# Patient Record
Sex: MALE | Race: WHITE
[De-identification: names, ages, dates, MRNs, and addresses within clinical notes are randomized per-mention and may not be internally consistent; named-entity substitution may affect disease eponyms.]

---

## 2020-02-18 ENCOUNTER — HOSPITAL ENCOUNTER (INPATIENT)
Dept: HOSPITAL 74 - YASAS | Age: 50
LOS: 6 days | Discharge: HOME | DRG: 773 | End: 2020-02-24
Attending: ALLERGY & IMMUNOLOGY | Admitting: ALLERGY & IMMUNOLOGY
Payer: COMMERCIAL

## 2020-02-18 VITALS — BODY MASS INDEX: 29.2 KG/M2

## 2020-02-18 DIAGNOSIS — F10.230: ICD-10-CM

## 2020-02-18 DIAGNOSIS — R03.0: ICD-10-CM

## 2020-02-18 DIAGNOSIS — F11.23: Primary | ICD-10-CM

## 2020-02-18 DIAGNOSIS — F14.20: ICD-10-CM

## 2020-02-18 DIAGNOSIS — F19.24: ICD-10-CM

## 2020-02-18 DIAGNOSIS — H60.91: ICD-10-CM

## 2020-02-18 DIAGNOSIS — F19.282: ICD-10-CM

## 2020-02-18 DIAGNOSIS — F17.210: ICD-10-CM

## 2020-02-18 PROCEDURE — HZ2ZZZZ DETOXIFICATION SERVICES FOR SUBSTANCE ABUSE TREATMENT: ICD-10-PCS | Performed by: ALLERGY & IMMUNOLOGY

## 2020-02-18 RX ADMIN — Medication SCH MG: at 21:48

## 2020-02-18 NOTE — HP
COWS





- Scale


Resting Pulse: 0= [place] 80 or Below


Sweatin= Chills/Flushing


Restless Observation: 1= Difficult to Sit Still


Pupil Size: 0= Normal to Room Light


Bone or Joint Aches: 2= Severe Diffuse Aches


Runny Nose/ Eye Tearin= Runny Nose/Eyes


GI Upset > 30mins: 2= Nausea/Diarrhea


Tremor Observation: 0= None


Yawning Observation: 0= None


Anxiety or Irritability: 1=Feels Anxious/Irritable


Goose Flesh Skin: 0=Smooth Skin (latest  Bup yesterday   evening  . denies MMTP

  cocaine :  2 x/week  etoh - denies   other  illicits  - denies  tobacco :    ppd  .)


COWS Score: 9





CIWA Score


Nausea/Vomitin-No Nausea/No Vomiting


Muscle Tremors: None


Anxiety: 1-Mildly Anxious


Agitation: 0-Normal Activity


Paroxysmal Sweats: 1-Minimal Palms Moist


Orientation: 2-Disoriented Date<2 days


Tacttile Disturbances: 0-None


Auditory Disturbances: 0-None


Visual Disturbances: 0-None


Headache: 0-None Present


CIWA-Ar Total Score: 4





- Admission Criteria


[facility] Guidelines: Admission for Medically Managed Detox: 


Requires at least one of the followin. CIWA greater than 12


2. Seizures within the past 24 hours


3. Delirium tremens within the past 24 hours


4. Hallucinations within the past 24 hours


5. Acute intervention needed for co  occurring medical disorder


6. Acute intervention needed for co  occurring psychiatric disorder


7. Severe withdrawal that cannot be handled at a lower level of care (continued


    vomiting, continued diarrhea, abnormal vital signs) requiring intravenous


    medication and/or fluids


8. Pregnancy








Admission ROS [facility]





- HPI


Allergies/Adverse Reactions: 


 Allergies











Allergy/AdvReac Type Severity Reaction Status Date / Time


 


No Known Allergies Allergy   Verified 20 20:24











History of Present Illness: 





50  y.o. male her for heroin detox  , states does not want to continue in 

Buprenorphine program in [place]  . 


PMHX : anxiety  and HTN  , reports non- compliance  w/ meds. 


PSHX :  left  eye  injury  37  yrs ago 


Exam Limitations: Clinical Condition





- Review of Systems


Constitutional: No Symptoms Reported


EENT: reports: See HPI, Nose Congestion


Respiratory: reports: No Symptoms reported


Cardiac: reports: No Symptoms Reported


GI: reports: Nausea, Abdominal cramping


: reports: No Symptoms Reported


Musculoskeletal: reports: No Symptoms Reported


Integumentary: reports: No Symptoms Reported


Neuro: reports: No Symptoms reported


Endocrine: reports: No Symptoms Reported


Psychiatric: reports: Orientated x3, Agitated, Anxious





Patient History





- Smoking Cessation


Smoking history: Current every day smoker


Have you smoked in the past 12 months: Yes


Hx Chewing Tobacco Use: No


Initiated information on smoking cessation: Yes


'Breaking Loose' booklet given: 20





- Substances abused


  ** Heroin


Substance route: Inhalation


Frequency: Daily


Amount used: 1 bundle


Age of first use: 22


Date of last use: 20





  ** Alcohol


Substance route: Oral


Frequency: Daily


Amount used: 4 beers/ small bottle.


Age of first use: 17


Date of last use: 20





Admission Physical Exam [facility]





- Physical


General Appearance: Yes: Disheveled, Mild Distress, Sweating, Anxious


HEENTM: Yes: EOMI, Normocephalic, Normal Voice, Nasal Congestion, Rhinorrhea, 

Other (upper dentures)


Respiratory: Yes: Chest Non-Tender, Lungs Clear, Normal Breath Sounds, No 

Respiratory Distress, No Accessory Muscle Use


Neck: Yes: No masses,lesions,Nodules, Trachea in good position


Cardiology: Yes: Regular Rhythm, Regular Rate, S1, S2


Abdominal: Yes: Non Tender, Soft


Musculoskeletal: Yes: Gait Steady


Extremities: Yes: Normal Range of Motion, Non-Tender


Neurological: Yes: Alert, Motor Strength 5/5, Normal Mood/Affect


Integumentary: Yes: Warm





- Diagnostic


(1) Opioid dependence


Current Visit: Yes   Status: Chronic   


Qualifiers: 


   Substance use status: uncomplicated   Qualified Code(s): F11.20 - Opioid 

dependence, uncomplicated   





(2) Cocaine use disorder


Current Visit: Yes   Status: Chronic   





(3) Alcohol use disorder


Current Visit: Yes   Status: Chronic   





(4) Nicotine dependence


Current Visit: Yes   Status: Chronic   


Qualifiers: 


   Nicotine product type: cigarettes 





Inpatient Rehab Admission





- Rehab Decision to Admit


Inpatient rehab admission?: No

## 2020-02-19 LAB
ALBUMIN SERPL-MCNC: 3.2 G/DL (ref 3.4–5)
ALP SERPL-CCNC: 97 U/L (ref 45–117)
ALT SERPL-CCNC: 32 U/L (ref 13–61)
ANION GAP SERPL CALC-SCNC: 8 MMOL/L (ref 8–16)
AST SERPL-CCNC: 16 U/L (ref 15–37)
BILIRUB SERPL-MCNC: 0.2 MG/DL (ref 0.2–1)
BUN SERPL-MCNC: 15.4 MG/DL (ref 7–18)
CALCIUM SERPL-MCNC: 8.7 MG/DL (ref 8.5–10.1)
CHLORIDE SERPL-SCNC: 108 MMOL/L (ref 98–107)
CO2 SERPL-SCNC: 26 MMOL/L (ref 21–32)
CREAT SERPL-MCNC: 0.9 MG/DL (ref 0.55–1.3)
DEPRECATED RDW RBC AUTO: 12.9 % (ref 11.9–15.9)
GLUCOSE SERPL-MCNC: 103 MG/DL (ref 74–106)
HCT VFR BLD CALC: 41 % (ref 35.4–49)
HGB BLD-MCNC: 14.1 GM/DL (ref 11.7–16.9)
MCH RBC QN AUTO: 30.7 PG (ref 25.7–33.7)
MCHC RBC AUTO-ENTMCNC: 34.3 G/DL (ref 32–35.9)
MCV RBC: 89.6 FL (ref 80–96)
PLATELET # BLD AUTO: 181 K/MM3 (ref 134–434)
PMV BLD: 10.3 FL (ref 7.5–11.1)
POTASSIUM SERPLBLD-SCNC: 4 MMOL/L (ref 3.5–5.1)
PROT SERPL-MCNC: 6.6 G/DL (ref 6.4–8.2)
RBC # BLD AUTO: 4.57 M/MM3 (ref 4–5.6)
SODIUM SERPL-SCNC: 141 MMOL/L (ref 136–145)
WBC # BLD AUTO: 6.4 K/MM3 (ref 4–10)

## 2020-02-19 RX ADMIN — Medication SCH TAB: at 10:11

## 2020-02-19 RX ADMIN — Medication PRN MG: at 22:08

## 2020-02-19 RX ADMIN — METHOCARBAMOL PRN MG: 500 TABLET ORAL at 22:08

## 2020-02-19 RX ADMIN — METHOCARBAMOL PRN MG: 500 TABLET ORAL at 10:13

## 2020-02-19 RX ADMIN — Medication SCH MG: at 22:08
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
## 2020-02-19 NOTE — EKG
Test Reason : 

Blood Pressure : ***/*** mmHG

Vent. Rate : 077 BPM     Atrial Rate : 077 BPM

   P-R Int : 160 ms          QRS Dur : 098 ms

    QT Int : 366 ms       P-R-T Axes : 070 039 040 degrees

   QTc Int : 414 ms

 

NORMAL SINUS RHYTHM

NORMAL ECG

NO PREVIOUS ECGS AVAILABLE

Confirmed by MD MARIYA, ABIGAIL ([id number]) on 2/19/2020 11:32:44 AM

 

Referred By: Alber Hernandez           Confirmed By:ABIGAIL MERAZ MD

## 2020-02-19 NOTE — PN
[facility] CIWA





- CIWA Score


Nausea/Vomitin-Mild Nausea/No Vomiting


Muscle Tremors: 2


Anxiety: 2


Agitation: 2


Paroxysmal Sweats: No Perspiration


Orientation: 0-Oriented


Tacttile Disturbances: 1-Very Mild Itch/Numbness


Auditory Disturbances: 0-None


Visual Disturbances: 0-None


Headache: 1-Very Mild


CIWA-Ar Total Score: 9





[facility] COWS





- Scale


Resting Pulse: 1= [place] 


Sweatin= No chills or Flushing


Restless Observation: 1= Difficult to Sit Still


Pupil Size: 1= Pupils >than Normal


Bone or Joint Aches: 1= Mild Discomfort


Runny Nose/ Eye Tearin= Nasal Congestion


GI Upset > 30mins: 1= Stomach Cramp


Tremor Observation of Outstretched Hands: 1= Tremor Felt, Not Seen


Yawning Observation: 1= 1-2x During Session


Anxiety or Irritability: 2=Irritable/Anxious


Goose Flesh Skin: 0=Smooth Skin


COWS Score: 10





B[facility] Progress Note (SOAP)


Subjective: 





alert,irritable,anxious,interrupted sleep,pain in the body,back,tremor


Objective: 





20 10:45


 Vital Signs











Temperature  97.5 F L  20 09:20


 


Pulse Rate  90   20 09:20


 


Respiratory Rate  19   20 09:20


 


Blood Pressure  134/69   20 09:20


 


O2 Sat by Pulse Oximetry (%)      











20 10:46


labs pending


Assessment: 





20 10:46


withdrawal symptom


Plan: 





continue detox methadone regimen,will add valuim 10mgs q 4 hrs prn for 72 hrs

## 2020-02-20 RX ADMIN — ALUMINUM HYDROXIDE, MAGNESIUM HYDROXIDE, AND SIMETHICONE PRN ML: 200; 200; 20 SUSPENSION ORAL at 10:25

## 2020-02-20 RX ADMIN — NEOMYCIN SULFATE, POLYMYXIN B SULFATE AND HYDROCORTISONE SCH DROP: 10; 3.5; 1 SUSPENSION/ DROPS AURICULAR (OTIC) at 17:30

## 2020-02-20 RX ADMIN — ALUMINUM HYDROXIDE, MAGNESIUM HYDROXIDE, AND SIMETHICONE PRN ML: 200; 200; 20 SUSPENSION ORAL at 03:31

## 2020-02-20 RX ADMIN — NEOMYCIN SULFATE, POLYMYXIN B SULFATE AND HYDROCORTISONE SCH DROP: 10; 3.5; 1 SUSPENSION/ DROPS AURICULAR (OTIC) at 15:45

## 2020-02-20 RX ADMIN — Medication SCH TAB: at 10:23

## 2020-02-20 RX ADMIN — Medication SCH MG: at 22:08

## 2020-02-20 RX ADMIN — Medication PRN MG: at 22:09

## 2020-02-20 NOTE — PN
S CIWA





- CIWA Score


Nausea/Vomitin-No Nausea/No Vomiting


Muscle Tremors: 2


Anxiety: 2


Agitation: 2


Paroxysmal Sweats: No Perspiration


Orientation: 0-Oriented


Tacttile Disturbances: 1-Very Mild Itch/Numbness


Auditory Disturbances: 0-None


Visual Disturbances: 0-None


Headache: 1-Very Mild


CIWA-Ar Total Score: 8





[facility] Progress Note (SOAP)


Subjective: 





alert,irritable,anxious,interrupted sleep,pain in the body,pain in the right ear


Objective: 





20 09:47


 Vital Signs











Temperature  97.7 F   20 05:04


 


Pulse Rate  58 L  20 05:04


 


Respiratory Rate  18   20 05:04


 


Blood Pressure  104/65   20 05:04


 


O2 Sat by Pulse Oximetry (%)      











20 09:48


 Laboratory Last Values











WBC  6.4 K/mm3 (4.0-10.0)   20  07:35    


 


RBC  4.57 M/mm3 (4.00-5.60)   20  07:35    


 


Hgb  14.1 GM/dL (11.7-16.9)   20  07:35    


 


Hct  41.0 % (35.4-49)   20  07:35    


 


MCV  89.6 fl (80-96)   20  07:35    


 


MCH  30.7 pg (25.7-33.7)   20  07:35    


 


MCHC  34.3 g/dl (32.0-35.9)   20  07:35    


 


RDW  12.9 % (11.9-15.9)   20  07:35    


 


Plt Count  181 K/MM3 (134-434)   20  07:35    


 


MPV  10.3 fl (7.5-11.1)   20  07:35    


 


Sodium  141 mmol/L (136-145)   20  07:35    


 


Potassium  4.0 mmol/L (3.5-5.1)   20  07:35    


 


Chloride  108 mmol/L ()  H  20  07:35    


 


Carbon Dioxide  26 mmol/L (21-32)   20  07:35    


 


Anion Gap  8 MMOL/L (8-16)   20  07:35    


 


BUN  15.4 mg/dL (7-18)   20  07:35    


 


Creatinine  0.9 mg/dL (0.55-1.3)   20  07:35    


 


Est GFR (CKD-EPI)AfAm  116.65   20  07:35    


 


Est GFR (CKD-EPI)NonAf  100.64   20  07:35    


 


Random Glucose  103 mg/dL ()   20  07:35    


 


Calcium  8.7 mg/dL (8.5-10.1)   20  07:35    


 


Total Bilirubin  0.2 mg/dL (0.2-1)   20  07:35    


 


AST  16 U/L (15-37)   20  07:35    


 


ALT  32 U/L (13-61)   20  07:35    


 


Alkaline Phosphatase  97 U/L ()   20  07:35    


 


Total Protein  6.6 g/dl (6.4-8.2)   20  07:35    


 


Albumin  3.2 g/dl (3.4-5.0)  L  20  07:35    


 


RPR Titer  Nonreactive  (NONREACTIVE)   20  07:35    











Assessment: 





20 09:48


withdrawal symptom


20 09:49


redness of external ear canal


otitis externa


Plan: 





continue detox methadone regimen,cortisporin otic suspension 4 gtts right ear q 

6 hrs

## 2020-02-21 RX ADMIN — NEOMYCIN SULFATE, POLYMYXIN B SULFATE AND HYDROCORTISONE SCH DROP: 10; 3.5; 1 SUSPENSION/ DROPS AURICULAR (OTIC) at 18:16

## 2020-02-21 RX ADMIN — NEOMYCIN SULFATE, POLYMYXIN B SULFATE AND HYDROCORTISONE SCH DROP: 10; 3.5; 1 SUSPENSION/ DROPS AURICULAR (OTIC) at 11:43

## 2020-02-21 RX ADMIN — NEOMYCIN SULFATE, POLYMYXIN B SULFATE AND HYDROCORTISONE SCH DROP: 10; 3.5; 1 SUSPENSION/ DROPS AURICULAR (OTIC) at 05:10

## 2020-02-21 RX ADMIN — Medication SCH MG: at 22:11

## 2020-02-21 RX ADMIN — ALUMINUM HYDROXIDE, MAGNESIUM HYDROXIDE, AND SIMETHICONE PRN ML: 200; 200; 20 SUSPENSION ORAL at 10:16

## 2020-02-21 RX ADMIN — Medication SCH TAB: at 10:15

## 2020-02-21 RX ADMIN — Medication PRN MG: at 22:12

## 2020-02-21 RX ADMIN — NEOMYCIN SULFATE, POLYMYXIN B SULFATE AND HYDROCORTISONE SCH DROP: 10; 3.5; 1 SUSPENSION/ DROPS AURICULAR (OTIC) at 00:10

## 2020-02-21 NOTE — PN
[facility] COWS





- Scale


Resting Pulse: 0= [place] 80 or Below


Sweatin= No chills or Flushing


Restless Observation: 1= Difficult to Sit Still


Pupil Size: 1= Pupils >than Normal


Bone or Joint Aches: 1= Mild Discomfort


Runny Nose/ Eye Tearin= Nasal Congestion


GI Upset > 30mins: 1= Stomach Cramp


Tremor Observation of Outstretched Hands: 2= Slight Tremor Visible


Yawning Observation: 1= 1-2x During Session


Anxiety or Irritability: 2=Irritable/Anxious


Goose Flesh Skin: 0=Smooth Skin


COWS Score: 10





[facility] Progress Note (SOAP)


Subjective: 





alert,irritable,anxious,interrupted sleep,pain in the body


Objective: 





20 08:48


alert,irritable,anxious,interrupted sleep,tremor,pain in the body


20 08:48


 Vital Signs











Temperature  97.5 F L  20 06:17


 


Pulse Rate  72   20 06:17


 


Respiratory Rate  16   20 06:17


 


Blood Pressure  151/83   20 06:17


 


O2 Sat by Pulse Oximetry (%)      











Assessment: 





20 08:48


withdrawal symptom


Plan: 





continue detox methadone regimen,pain in the right ear is less
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
## 2020-02-22 RX ADMIN — NEOMYCIN SULFATE, POLYMYXIN B SULFATE AND HYDROCORTISONE SCH DROP: 10; 3.5; 1 SUSPENSION/ DROPS AURICULAR (OTIC) at 13:00

## 2020-02-22 RX ADMIN — NEOMYCIN SULFATE, POLYMYXIN B SULFATE AND HYDROCORTISONE SCH DROP: 10; 3.5; 1 SUSPENSION/ DROPS AURICULAR (OTIC) at 05:39

## 2020-02-22 RX ADMIN — Medication PRN MG: at 21:37

## 2020-02-22 RX ADMIN — HYDROXYZINE PAMOATE PRN MG: 25 CAPSULE ORAL at 18:30

## 2020-02-22 RX ADMIN — NEOMYCIN SULFATE, POLYMYXIN B SULFATE AND HYDROCORTISONE SCH DROP: 10; 3.5; 1 SUSPENSION/ DROPS AURICULAR (OTIC) at 21:36

## 2020-02-22 RX ADMIN — Medication SCH TAB: at 10:11

## 2020-02-22 RX ADMIN — Medication SCH MG: at 21:37

## 2020-02-22 RX ADMIN — NEOMYCIN SULFATE, POLYMYXIN B SULFATE AND HYDROCORTISONE SCH DROP: 10; 3.5; 1 SUSPENSION/ DROPS AURICULAR (OTIC) at 00:06

## 2020-02-22 NOTE — PN
[facility] CIWA





- CIWA Score


Nausea/Vomitin-No Nausea/No Vomiting


Muscle Tremors: None


Anxiety: 4-Mod. Anxious/Guarded


Agitation: 1-Slight > Activity


Paroxysmal Sweats: No Perspiration


Orientation: 0-Oriented


Tacttile Disturbances: 0-None


Auditory Disturbances: 2-Mild Harshness/Frighten


Visual Disturbances: 0-None


Headache: 0-None Present


CIWA-Ar Total Score: 7





[facility] COWS





- Scale


Resting Pulse: 1= [place] 


Sweatin= No chills or Flushing


Restless Observation: 0= Sits Still


Pupil Size: 0= Normal to Room Light


Bone or Joint Aches: 0= None


Runny Nose/ Eye Tearin= None


GI Upset > 30mins: 0= None


Tremor Observation of Outstretched Hands: 0= None


Yawning Observation: 1= 1-2x During Session


Anxiety or Irritability: 2=Irritable/Anxious


Goose Flesh Skin: 0=Smooth Skin


COWS Score: 4





[facility] Progress Note (SOAP)


Subjective: 





Anxious.


Objective: 


PATIENT A & O X 3, OBSERVED AMBULATING ON DETOX UNIT UNASSISTED. IN NO ACUTE 

DISTRESS.





20 13:41


 Vital Signs











Temperature  96.8 F L  20 08:30


 


Pulse Rate  81   20 08:30


 


Respiratory Rate  18   20 08:30


 


Blood Pressure  113/58 L  20 08:30


 


O2 Sat by Pulse Oximetry (%)      








 Laboratory Tests











  20





  07:35 07:35 07:35


 


WBC  6.4  


 


RBC  4.57  


 


Hgb  14.1  


 


Hct  41.0  


 


MCV  89.6  


 


MCH  30.7  


 


MCHC  34.3  


 


RDW  12.9  


 


Plt Count  181  


 


MPV  10.3  


 


Sodium   141 


 


Potassium   4.0 


 


Chloride   108 H 


 


Carbon Dioxide   26 


 


Anion Gap   8 


 


BUN   15.4 


 


Creatinine   0.9 


 


Est GFR (CKD-EPI)AfAm   116.65 


 


Est GFR (CKD-EPI)NonAf   100.64 


 


Random Glucose   103 


 


Calcium   8.7 


 


Total Bilirubin   0.2 


 


AST   16 


 


ALT   32 


 


Alkaline Phosphatase   97 


 


Total Protein   6.6 


 


Albumin   3.2 L 


 


RPR Titer    Nonreactive








LABS NOTED.


Assessment: 





20 13:41


WITHDRAWAL SYMPTOMS.


Plan: 





CONTINUE DETOX.





PATIENT SCHEDULED FOR D/C FROM DETOX UNIT TOMORROW.

## 2020-02-23 RX ADMIN — Medication SCH MG: at 22:08

## 2020-02-23 RX ADMIN — METHOCARBAMOL PRN MG: 500 TABLET ORAL at 23:32

## 2020-02-23 RX ADMIN — NEOMYCIN SULFATE, POLYMYXIN B SULFATE AND HYDROCORTISONE SCH: 10; 3.5; 1 SUSPENSION/ DROPS AURICULAR (OTIC) at 02:15

## 2020-02-23 RX ADMIN — Medication SCH TAB: at 10:21

## 2020-02-23 RX ADMIN — NEOMYCIN SULFATE, POLYMYXIN B SULFATE AND HYDROCORTISONE SCH DROP: 10; 3.5; 1 SUSPENSION/ DROPS AURICULAR (OTIC) at 06:16

## 2020-02-23 RX ADMIN — NEOMYCIN SULFATE, POLYMYXIN B SULFATE AND HYDROCORTISONE SCH DROP: 10; 3.5; 1 SUSPENSION/ DROPS AURICULAR (OTIC) at 12:45

## 2020-02-23 RX ADMIN — Medication PRN MG: at 22:08

## 2020-02-23 RX ADMIN — HYDROXYZINE PAMOATE PRN MG: 25 CAPSULE ORAL at 23:32

## 2020-02-23 RX ADMIN — HYDROXYZINE PAMOATE PRN MG: 25 CAPSULE ORAL at 02:48

## 2020-02-23 RX ADMIN — ALUMINUM HYDROXIDE, MAGNESIUM HYDROXIDE, AND SIMETHICONE PRN ML: 200; 200; 20 SUSPENSION ORAL at 22:10

## 2020-02-23 RX ADMIN — NEOMYCIN SULFATE, POLYMYXIN B SULFATE AND HYDROCORTISONE SCH: 10; 3.5; 1 SUSPENSION/ DROPS AURICULAR (OTIC) at 20:21

## 2020-02-23 NOTE — PN
[facility] CIWA





- CIWA Score


Nausea/Vomitin-No Nausea/No Vomiting


Muscle Tremors: None


Anxiety: 2


Agitation: 1-Slight > Activity


Paroxysmal Sweats: 2


Orientation: 0-Oriented


Tacttile Disturbances: 0-None


Auditory Disturbances: 0-None


Visual Disturbances: 0-None


Headache: 0-None Present


CIWA-Ar Total Score: 5





S COWS





- Scale


Resting Pulse: 1= [place] 


Sweatin= No chills or Flushing


Restless Observation: 0= Sits Still


Pupil Size: 0= Normal to Room Light


Bone or Joint Aches: 1= Mild Discomfort


Runny Nose/ Eye Tearin= None


GI Upset > 30mins: 1= Stomach Cramp


Tremor Observation of Outstretched Hands: 0= None


Yawning Observation: 0= None


Anxiety or Irritability: 1=Feels Anxious/Irritable


Goose Flesh Skin: 0=Smooth Skin


COWS Score: 4





[facility] Progress Note (SOAP)


Subjective: 





Sweating, chills, tremor, no sleep, nausea, a little diarrhea. Patient 

scheduled for discharge today, he requested discharge tomorrow instead due to 

increased withdrawal sxs.


Objective: 





20 11:56


 Last Vital Signs











Temp Pulse Resp BP Pulse Ox


 


 97.7 F   84   20   141/78    


 


 20 09:35  20 09:35  20 09:35  20 09:35   








Elevated b/p: denies htn, most likely due to withdrawal





 Laboratory Tests











  20





  07:35 07:35 07:35


 


WBC  6.4  


 


RBC  4.57  


 


Hgb  14.1  


 


Hct  41.0  


 


MCV  89.6  


 


MCH  30.7  


 


MCHC  34.3  


 


RDW  12.9  


 


Plt Count  181  


 


MPV  10.3  


 


Sodium   141 


 


Potassium   4.0 


 


Chloride   108 H 


 


Carbon Dioxide   26 


 


Anion Gap   8 


 


BUN   15.4 


 


Creatinine   0.9 


 


Est GFR (CKD-EPI)AfAm   116.65 


 


Est GFR (CKD-EPI)NonAf   100.64 


 


Random Glucose   103 


 


Calcium   8.7 


 


Total Bilirubin   0.2 


 


AST   16 


 


ALT   32 


 


Alkaline Phosphatase   97 


 


Total Protein   6.6 


 


Albumin   3.2 L 


 


RPR Titer    Nonreactive








Labs reviewed


Assessment: 





20 11:57


Withdrawal sxs





Noted with elevated b/p


Plan: 





Continue detox


Encouraged PO water intake


Discharge canceled, changed to tomorrow due to increased withdrawal sxs


After reviewing detox protocol, writer believed patient didn't receive adequate 

detox medications, therefore, detox protocol extended as follows:


Started on clonidine prn, methadone 5mg PO x 1 ordered for tomorrow at 0600


Valium 5mg PO bid x 2 doses today, Valium 5mg PO x 1 dose tomorrow at 0600





Elevated b/p: denies htn, monitor b/p, clonidine 0.1mg PO q8hr prn if b/p >=140/

90, hold for b/p < 110/60

## 2020-02-24 VITALS — SYSTOLIC BLOOD PRESSURE: 144 MMHG | DIASTOLIC BLOOD PRESSURE: 81 MMHG | HEART RATE: 95 BPM | TEMPERATURE: 98.1 F

## 2020-02-24 RX ADMIN — NEOMYCIN SULFATE, POLYMYXIN B SULFATE AND HYDROCORTISONE SCH: 10; 3.5; 1 SUSPENSION/ DROPS AURICULAR (OTIC) at 01:08

## 2020-02-24 RX ADMIN — NEOMYCIN SULFATE, POLYMYXIN B SULFATE AND HYDROCORTISONE SCH DROP: 10; 3.5; 1 SUSPENSION/ DROPS AURICULAR (OTIC) at 05:32

## 2020-02-24 RX ADMIN — Medication SCH TAB: at 09:28

## 2020-02-24 NOTE — DS
BHS Detox Discharge Summary


Admission Date: 


02/18/20





Discharge Date: 02/24/20





- History


Present History: Alcohol Dependence, Cocaine Dependence, Opioid Dependence


Additional Comments: 





alert,oriented x 3


ambulation on the unit


heart normal heart sound,s1s2


lung clear no wheezing


abdomen soft,no distension,no pain


stable for discharge


time spending for discharge 35 mins


patient declined to go to [facility],would like to go home and [facility]





Pertinent Past History: 





nicotine dependence


otitis externa





- Physical Exam Results


Vital Signs: 


 Vital Signs











Temperature  98.1 F   02/24/20 09:08


 


Pulse Rate  95 H  02/24/20 09:08


 


Respiratory Rate  20   02/24/20 09:08


 


Blood Pressure  144/81   02/24/20 09:08


 


O2 Sat by Pulse Oximetry (%)      











Pertinent Admission Physical Exam Findings: 





withdrawal signs and symptom


 Laboratory Last Values











WBC  6.4 K/mm3 (4.0-10.0)   02/19/20  07:35    


 


RBC  4.57 M/mm3 (4.00-5.60)   02/19/20  07:35    


 


Hgb  14.1 GM/dL (11.7-16.9)   02/19/20  07:35    


 


Hct  41.0 % (35.4-49)   02/19/20  07:35    


 


MCV  89.6 fl (80-96)   02/19/20  07:35    


 


MCH  30.7 pg (25.7-33.7)   02/19/20  07:35    


 


MCHC  34.3 g/dl (32.0-35.9)   02/19/20  07:35    


 


RDW  12.9 % (11.9-15.9)   02/19/20  07:35    


 


Plt Count  181 K/MM3 (134-434)   02/19/20  07:35    


 


MPV  10.3 fl (7.5-11.1)   02/19/20  07:35    


 


Sodium  141 mmol/L (136-145)   02/19/20  07:35    


 


Potassium  4.0 mmol/L (3.5-5.1)   02/19/20  07:35    


 


Chloride  108 mmol/L ()  H  02/19/20  07:35    


 


Carbon Dioxide  26 mmol/L (21-32)   02/19/20  07:35    


 


Anion Gap  8 MMOL/L (8-16)   02/19/20  07:35    


 


BUN  15.4 mg/dL (7-18)   02/19/20  07:35    


 


Creatinine  0.9 mg/dL (0.55-1.3)   02/19/20  07:35    


 


Est GFR (CKD-EPI)AfAm  116.65   02/19/20  07:35    


 


Est GFR (CKD-EPI)NonAf  100.64   02/19/20  07:35    


 


Random Glucose  103 mg/dL ()   02/19/20  07:35    


 


Calcium  8.7 mg/dL (8.5-10.1)   02/19/20  07:35    


 


Total Bilirubin  0.2 mg/dL (0.2-1)   02/19/20  07:35    


 


AST  16 U/L (15-37)   02/19/20  07:35    


 


ALT  32 U/L (13-61)   02/19/20  07:35    


 


Alkaline Phosphatase  97 U/L ()   02/19/20  07:35    


 


Total Protein  6.6 g/dl (6.4-8.2)   02/19/20  07:35    


 


Albumin  3.2 g/dl (3.4-5.0)  L  02/19/20  07:35    


 


RPR Titer  Nonreactive  (NONREACTIVE)   02/19/20  07:35    








 Vital Signs











Temperature  98.1 F   02/24/20 09:08


 


Pulse Rate  95 H  02/24/20 09:08


 


Respiratory Rate  20   02/24/20 09:08


 


Blood Pressure  144/81   02/24/20 09:08


 


O2 Sat by Pulse Oximetry (%)      














- Treatment


Hospital Course: Detox Protocol Followed, Detoxed Safely, Responded well, 

Discharged Condition Good, Rehab Referral Accepted


Patient has Accepted a Rehab Referral to: revalation





- Medication


Discharge Medications: 


Ambulatory Orders





NK [No Known Home Medication]  02/18/20 











- Diagnosis


(1) Uncomplicated opioid dependence


Current Visit: Yes   Status: Acute   





(2) Alcohol use disorder


Current Visit: Yes   Status: Chronic   





(3) Cocaine use disorder


Current Visit: Yes   Status: Chronic   





(4) Nicotine dependence


Current Visit: Yes   Status: Chronic   


Qualifiers: 


   Nicotine product type: cigarettes 





(5) Otitis externa of right ear


Current Visit: Yes   Status: Acute   





- AMA


Did Patient Leave Against Medical Advice: No

## 2020-02-24 NOTE — PN
BHS COWS





- Scale


Resting Pulse: 0= MO 80 or Below


Sweatin= No chills or Flushing


Restless Observation: 0= Sits Still


Pupil Size: 0= Normal to Room Light


Bone or Joint Aches: 0= None


Runny Nose/ Eye Tearin= None


GI Upset > 30mins: 0= None


Tremor Observation of Outstretched Hands: 0= None


Yawning Observation: 0= None


Anxiety or Irritability: 1=Feels Anxious/Irritable


Goose Flesh Skin: 0=Smooth Skin


COWS Score: 1





BHS Progress Note (SOAP)


Subjective: 





alert,no complaint


Objective: 





20 09:58


 Vital Signs











Temperature  98.1 F   20 09:08


 


Pulse Rate  95 H  20 09:08


 


Respiratory Rate  20   20 09:08


 


Blood Pressure  144/81   20 09:08


 


O2 Sat by Pulse Oximetry (%)      











Assessment: 





20 09:58


detox completed,no withdrawal symptom


Plan: 


discharge today,patient decline to go to [facility] today,stated he will go to 

[facility] in am by [facility]

## 2020-05-01 ENCOUNTER — OUTPATIENT (OUTPATIENT)
Dept: OUTPATIENT SERVICES | Facility: HOSPITAL | Age: 50
LOS: 1 days | End: 2020-05-01
Payer: MEDICAID

## 2020-05-27 ENCOUNTER — EMERGENCY (EMERGENCY)
Facility: HOSPITAL | Age: 50
LOS: 1 days | Discharge: ANOTHER HOSPITAL | End: 2020-05-27
Attending: EMERGENCY MEDICINE | Admitting: EMERGENCY MEDICINE
Payer: COMMERCIAL

## 2020-05-27 ENCOUNTER — INPATIENT (INPATIENT)
Facility: HOSPITAL | Age: 50
LOS: 14 days | Discharge: PSYCHIATRIC OR SUBSTANCE USE FACILITY | DRG: 885 | End: 2020-06-11
Attending: PSYCHIATRY & NEUROLOGY | Admitting: PSYCHIATRY & NEUROLOGY
Payer: COMMERCIAL

## 2020-05-27 VITALS
SYSTOLIC BLOOD PRESSURE: 107 MMHG | TEMPERATURE: 98 F | OXYGEN SATURATION: 95 % | DIASTOLIC BLOOD PRESSURE: 59 MMHG | RESPIRATION RATE: 18 BRPM | HEART RATE: 85 BPM

## 2020-05-27 VITALS
HEIGHT: 70 IN | DIASTOLIC BLOOD PRESSURE: 88 MMHG | WEIGHT: 225.09 LBS | TEMPERATURE: 98 F | OXYGEN SATURATION: 98 % | RESPIRATION RATE: 16 BRPM | SYSTOLIC BLOOD PRESSURE: 143 MMHG | HEART RATE: 75 BPM

## 2020-05-27 DIAGNOSIS — U07.1 COVID-19: ICD-10-CM

## 2020-05-27 DIAGNOSIS — Z13.31 ENCOUNTER FOR SCREENING FOR DEPRESSION: ICD-10-CM

## 2020-05-27 DIAGNOSIS — R45.850 HOMICIDAL IDEATIONS: ICD-10-CM

## 2020-05-27 DIAGNOSIS — R41.0 DISORIENTATION, UNSPECIFIED: ICD-10-CM

## 2020-05-27 DIAGNOSIS — F32.9 MAJOR DEPRESSIVE DISORDER, SINGLE EPISODE, UNSPECIFIED: ICD-10-CM

## 2020-05-27 DIAGNOSIS — F32.3 MAJOR DEPRESSIVE DISORDER, SINGLE EPISODE, SEVERE WITH PSYCHOTIC FEATURES: ICD-10-CM

## 2020-05-27 LAB
ALBUMIN SERPL ELPH-MCNC: 4 G/DL — SIGNIFICANT CHANGE UP (ref 3.3–5)
ALP SERPL-CCNC: 67 U/L — SIGNIFICANT CHANGE UP (ref 40–120)
ALT FLD-CCNC: 62 U/L — HIGH (ref 10–45)
ANION GAP SERPL CALC-SCNC: 12 MMOL/L — SIGNIFICANT CHANGE UP (ref 5–17)
APAP SERPL-MCNC: <5 UG/ML — LOW (ref 10–30)
APPEARANCE UR: CLEAR — SIGNIFICANT CHANGE UP
AST SERPL-CCNC: 27 U/L — SIGNIFICANT CHANGE UP (ref 10–40)
BASOPHILS # BLD AUTO: 0.03 K/UL — SIGNIFICANT CHANGE UP (ref 0–0.2)
BASOPHILS NFR BLD AUTO: 0.5 % — SIGNIFICANT CHANGE UP (ref 0–2)
BILIRUB SERPL-MCNC: 0.9 MG/DL — SIGNIFICANT CHANGE UP (ref 0.2–1.2)
BILIRUB UR-MCNC: ABNORMAL
BUN SERPL-MCNC: 8 MG/DL — SIGNIFICANT CHANGE UP (ref 7–23)
CALCIUM SERPL-MCNC: 9.3 MG/DL — SIGNIFICANT CHANGE UP (ref 8.4–10.5)
CHLORIDE SERPL-SCNC: 103 MMOL/L — SIGNIFICANT CHANGE UP (ref 96–108)
CO2 SERPL-SCNC: 22 MMOL/L — SIGNIFICANT CHANGE UP (ref 22–31)
COLOR SPEC: YELLOW — SIGNIFICANT CHANGE UP
CREAT SERPL-MCNC: 0.87 MG/DL — SIGNIFICANT CHANGE UP (ref 0.5–1.3)
DIFF PNL FLD: NEGATIVE — SIGNIFICANT CHANGE UP
EOSINOPHIL # BLD AUTO: 0.09 K/UL — SIGNIFICANT CHANGE UP (ref 0–0.5)
EOSINOPHIL NFR BLD AUTO: 1.4 % — SIGNIFICANT CHANGE UP (ref 0–6)
ETHANOL SERPL-MCNC: <10 MG/DL — SIGNIFICANT CHANGE UP (ref 0–10)
GLUCOSE SERPL-MCNC: 89 MG/DL — SIGNIFICANT CHANGE UP (ref 70–99)
GLUCOSE UR QL: NEGATIVE — SIGNIFICANT CHANGE UP
HCT VFR BLD CALC: 39.7 % — SIGNIFICANT CHANGE UP (ref 39–50)
HGB BLD-MCNC: 13.5 G/DL — SIGNIFICANT CHANGE UP (ref 13–17)
IMM GRANULOCYTES NFR BLD AUTO: 0.2 % — SIGNIFICANT CHANGE UP (ref 0–1.5)
KETONES UR-MCNC: ABNORMAL MG/DL
LEUKOCYTE ESTERASE UR-ACNC: NEGATIVE — SIGNIFICANT CHANGE UP
LYMPHOCYTES # BLD AUTO: 1.69 K/UL — SIGNIFICANT CHANGE UP (ref 1–3.3)
LYMPHOCYTES # BLD AUTO: 26 % — SIGNIFICANT CHANGE UP (ref 13–44)
MCHC RBC-ENTMCNC: 29.6 PG — SIGNIFICANT CHANGE UP (ref 27–34)
MCHC RBC-ENTMCNC: 34 GM/DL — SIGNIFICANT CHANGE UP (ref 32–36)
MCV RBC AUTO: 87.1 FL — SIGNIFICANT CHANGE UP (ref 80–100)
MONOCYTES # BLD AUTO: 0.51 K/UL — SIGNIFICANT CHANGE UP (ref 0–0.9)
MONOCYTES NFR BLD AUTO: 7.9 % — SIGNIFICANT CHANGE UP (ref 2–14)
NEUTROPHILS # BLD AUTO: 4.16 K/UL — SIGNIFICANT CHANGE UP (ref 1.8–7.4)
NEUTROPHILS NFR BLD AUTO: 64 % — SIGNIFICANT CHANGE UP (ref 43–77)
NITRITE UR-MCNC: NEGATIVE — SIGNIFICANT CHANGE UP
NRBC # BLD: 0 /100 WBCS — SIGNIFICANT CHANGE UP (ref 0–0)
PCP SPEC-MCNC: SIGNIFICANT CHANGE UP
PH UR: 6 — SIGNIFICANT CHANGE UP (ref 5–8)
PLATELET # BLD AUTO: 234 K/UL — SIGNIFICANT CHANGE UP (ref 150–400)
POTASSIUM SERPL-MCNC: 3.5 MMOL/L — SIGNIFICANT CHANGE UP (ref 3.5–5.3)
POTASSIUM SERPL-SCNC: 3.5 MMOL/L — SIGNIFICANT CHANGE UP (ref 3.5–5.3)
PROT SERPL-MCNC: 6.9 G/DL — SIGNIFICANT CHANGE UP (ref 6–8.3)
PROT UR-MCNC: ABNORMAL MG/DL
RBC # BLD: 4.56 M/UL — SIGNIFICANT CHANGE UP (ref 4.2–5.8)
RBC # FLD: 12.6 % — SIGNIFICANT CHANGE UP (ref 10.3–14.5)
SALICYLATES SERPL-MCNC: <0.3 MG/DL — LOW (ref 2.8–20)
SARS-COV-2 RNA SPEC QL NAA+PROBE: DETECTED
SODIUM SERPL-SCNC: 137 MMOL/L — SIGNIFICANT CHANGE UP (ref 135–145)
SP GR SPEC: 1.02 — SIGNIFICANT CHANGE UP (ref 1–1.03)
TROPONIN T SERPL-MCNC: <0.01 NG/ML — SIGNIFICANT CHANGE UP (ref 0–0.01)
UROBILINOGEN FLD QL: 1 E.U./DL — SIGNIFICANT CHANGE UP
WBC # BLD: 6.49 K/UL — SIGNIFICANT CHANGE UP (ref 3.8–10.5)
WBC # FLD AUTO: 6.49 K/UL — SIGNIFICANT CHANGE UP (ref 3.8–10.5)

## 2020-05-27 PROCEDURE — 71046 X-RAY EXAM CHEST 2 VIEWS: CPT | Mod: 26

## 2020-05-27 PROCEDURE — 93010 ELECTROCARDIOGRAM REPORT: CPT

## 2020-05-27 PROCEDURE — 36415 COLL VENOUS BLD VENIPUNCTURE: CPT

## 2020-05-27 PROCEDURE — 85025 COMPLETE CBC W/AUTO DIFF WBC: CPT

## 2020-05-27 PROCEDURE — 85379 FIBRIN DEGRADATION QUANT: CPT

## 2020-05-27 PROCEDURE — 96372 THER/PROPH/DIAG INJ SC/IM: CPT | Mod: XU

## 2020-05-27 PROCEDURE — 71046 X-RAY EXAM CHEST 2 VIEWS: CPT

## 2020-05-27 PROCEDURE — 82728 ASSAY OF FERRITIN: CPT

## 2020-05-27 PROCEDURE — 80164 ASSAY DIPROPYLACETIC ACD TOT: CPT

## 2020-05-27 PROCEDURE — 83615 LACTATE (LD) (LDH) ENZYME: CPT

## 2020-05-27 PROCEDURE — 93005 ELECTROCARDIOGRAM TRACING: CPT

## 2020-05-27 PROCEDURE — 80307 DRUG TEST PRSMV CHEM ANLYZR: CPT

## 2020-05-27 PROCEDURE — 80053 COMPREHEN METABOLIC PANEL: CPT

## 2020-05-27 PROCEDURE — 84484 ASSAY OF TROPONIN QUANT: CPT

## 2020-05-27 PROCEDURE — 81001 URINALYSIS AUTO W/SCOPE: CPT

## 2020-05-27 PROCEDURE — 70450 CT HEAD/BRAIN W/O DYE: CPT

## 2020-05-27 PROCEDURE — 87635 SARS-COV-2 COVID-19 AMP PRB: CPT

## 2020-05-27 PROCEDURE — 86140 C-REACTIVE PROTEIN: CPT

## 2020-05-27 PROCEDURE — 90792 PSYCH DIAG EVAL W/MED SRVCS: CPT

## 2020-05-27 PROCEDURE — 99285 EMERGENCY DEPT VISIT HI MDM: CPT | Mod: 25

## 2020-05-27 PROCEDURE — 96374 THER/PROPH/DIAG INJ IV PUSH: CPT

## 2020-05-27 PROCEDURE — 70450 CT HEAD/BRAIN W/O DYE: CPT | Mod: 26

## 2020-05-27 PROCEDURE — U0003: CPT

## 2020-05-27 PROCEDURE — 84145 PROCALCITONIN (PCT): CPT

## 2020-05-27 RX ORDER — HALOPERIDOL DECANOATE 100 MG/ML
2 INJECTION INTRAMUSCULAR ONCE
Refills: 0 | Status: COMPLETED | OUTPATIENT
Start: 2020-05-27 | End: 2020-05-27

## 2020-05-27 RX ORDER — DIPHENHYDRAMINE HCL 50 MG
50 CAPSULE ORAL ONCE
Refills: 0 | Status: COMPLETED | OUTPATIENT
Start: 2020-05-27 | End: 2020-05-27

## 2020-05-27 RX ORDER — HALOPERIDOL DECANOATE 100 MG/ML
5 INJECTION INTRAMUSCULAR ONCE
Refills: 0 | Status: DISCONTINUED | OUTPATIENT
Start: 2020-05-27 | End: 2020-05-27

## 2020-05-27 RX ORDER — HALOPERIDOL DECANOATE 100 MG/ML
5 INJECTION INTRAMUSCULAR ONCE
Refills: 0 | Status: COMPLETED | OUTPATIENT
Start: 2020-05-27 | End: 2020-05-27

## 2020-05-27 RX ADMIN — HALOPERIDOL DECANOATE 2 MILLIGRAM(S): 100 INJECTION INTRAMUSCULAR at 15:59

## 2020-05-27 RX ADMIN — Medication 50 MILLIGRAM(S): at 19:02

## 2020-05-27 RX ADMIN — Medication 1 MILLIGRAM(S): at 19:02

## 2020-05-27 RX ADMIN — HALOPERIDOL DECANOATE 5 MILLIGRAM(S): 100 INJECTION INTRAMUSCULAR at 19:02

## 2020-05-27 RX ADMIN — Medication 1 MILLIGRAM(S): at 13:37

## 2020-05-27 NOTE — ED ADULT NURSE NOTE - ORIENTED TO PERSON
Care Everywhere (CE) procedure/test/imaging document can be found within Chart Review under the Encounters Tab  Yes

## 2020-05-27 NOTE — ED BEHAVIORAL HEALTH ASSESSMENT NOTE - HPI (INCLUDE ILLNESS QUALITY, SEVERITY, DURATION, TIMING, CONTEXT, MODIFYING FACTORS, ASSOCIATED SIGNS AND SYMPTOMS)
***NOTE INCOMPLETE***    Interview limited due to mental status. No collateral obtained as of yet.    50M, hx depression and anxiety, former heroin use, in OPD psychiatry (prescribed Lexapro, non-adherent), no known PMH, BIBEMS activated by self due to cough and diarrhea, consulted for depression and voicing homicidal ideation towards his cousin. Patient has difficulty describing the circumstances of his admission, but ultimately is able to say that he called 911. He agrees when asked if he has been confused lately, saying that he has been for about 2 weeks. He endorses AH, but is unable to provide any details. Endorses VH of shadows, unable to elaborate. He endorsed SI without plan to primary team. He said that if his cousin Miki (the owner of [employer]) did indeed cause him to contract COVID, he will kill him by strangling him. He has slept little in the last few days, but unable to get more detail about sleep. He had a prior SA by overdosing many years ago. He denies any recent substance use, noting that he last used heroin over 20 years ago. He is mostly oriented to place, knowing that he is in a hospital but says "[facility]", and disoriented to time, knowing that it is 2020 but not being able to say the month even when given multiple choice. Interview limited due to mental status. Was not able to contact collateral.    50M, hx depression and anxiety, former heroin use, in OPD psychiatry (prescribed Lexapro, non-adherent), no known PMH, BIBEMS activated by self due to cough and diarrhea, consulted for depression and voicing homicidal ideation towards his cousin. Patient has difficulty describing the circumstances of his admission, but ultimately is able to say that he called 911. He agrees when asked if he has been confused lately, saying that he has been for about 2 weeks. He endorses AH, but is unable to provide any details. Endorses VH of shadows, unable to elaborate. He endorsed SI without plan to primary team. He said that if his cousin Miki (the owner of [employer]) did indeed cause him to contract COVID, he will kill him by strangling him. He has slept little in the last few days, but unable to get more detail about sleep. He had a prior SA by overdosing many years ago. He denies any recent substance use, noting that he last used heroin over 20 years ago. He is mostly oriented to place, [facility] and disoriented to time, knowing that it is 2020 but not being able to say the month even when given multiple choice. Interview limited due to mental status. Was not able to contact collateral.    50M, hx depression and anxiety, former heroin use, in OPD psychiatry, Dr. Lane at [facility] (prescribed Lexapro, non-adherent), no known PMH, BIBEMS activated by self due to cough and diarrhea, consulted for depression and voicing homicidal ideation towards his cousin/boss. Says his cousin has been spreading COVID and he worries he has it. Pt did indeed test positive for COVID. Patient has difficulty describing the circumstances of his admission, but ultimately is able to say that he called 911. He agrees when asked if he has been confused lately, saying that he has been for about 2 weeks. He endorses AH, mostly of babies crying which he knows aren't there. Endorses VH of shadows, unable to elaborate. He endorsed SI without plan to primary team. He said that if his cousin Miki (the owner of [email or web address]) did indeed cause him to contract COVID, he will kill him by strangling him. He has slept little if at all  in the last few days, but unable to get more detail about sleep. He had a prior SA by overdosing many years ago. He denies any recent substance use, noting that he last used heroin over 20 years ago. He is mostly oriented to place, [facility] and disoriented to time, knowing that it is 2020 but not being able to say the month even when given multiple choice.

## 2020-05-27 NOTE — ED BEHAVIORAL HEALTH ASSESSMENT NOTE - CASE SUMMARY
Delirium work-up completed and negative, Pt still has +HI, +SI, poor sleep, depression, disorganized thought process. Will try to transfer to inpatient psychiatric unit at [facility] for patients with COVID. have tried repeatedly to call wife and outpatient psychiatrist.

## 2020-05-27 NOTE — ED PROVIDER NOTE - DIAGNOSTIC INTERPRETATION
ER PA: Sheree Shea  CHEST XRAY INTERPRETATION: lungs clear, heart shadow normal, bony structures intact

## 2020-05-27 NOTE — ED BEHAVIORAL HEALTH NOTE - BEHAVIORAL HEALTH NOTE
Handoff received by Dr. Elizabeth Barnes regarding this case. Patient is COVID positive and will be going involuntarily to . Patient was recently given haldol 5 mg and ativan 2 mg for agitation and is now sleeping comfortably. Will see patient when he arrives at [facility] for tele part B.

## 2020-05-27 NOTE — ED PROVIDER NOTE - OBJECTIVE STATEMENT
pnhx depression (rx lexapro, has not been taking, follows with Dr. Yoder, [facility]). states that he has been feeling "off and more depressed" over the past few days. pnhx depression (rx lexapro, has not been taking, follows with Dr. Yoder, [facility]). states that he has been feeling "off and more depressed" over the past few days. states that he has been having a cough and diarrhea last week. does not endorse fevers chills chest pain palpitations nausea vomiting abdominal pain.  states that "I think my cousin gave me covid- and I am going to kill him". 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 pt states that his cousin "probably has it and is spreading it around to people and me". does not have a plan. patient endorse thoughts of suicide, no plan. does not endorse auditory or viscual hallucination. no illicit drug use/alcohol use.

## 2020-05-27 NOTE — ED BEHAVIORAL HEALTH ASSESSMENT NOTE - DESCRIPTION
no medications given HTN unable to assess ativan 1 mg IM prior to head CT w/o contrast which was negative. COVID Positive. Medically cleared as vitals  and labs wnl, as was CXR. UTOX +benzos but he did get ativan prior to urine test. From [facility], works at [employer]

## 2020-05-27 NOTE — ED BEHAVIORAL HEALTH ASSESSMENT NOTE - SUMMARY
***NOTE INCOMPLETE***    Assessment is limited due to patient's mental status and lack of collateral information.    50M, hx depression and anxiety, former heroin use, in OPD psychiatry (prescribed Lexapro, non-adherent), hx SA many years ago, no known PMH, BIBEMS activated by self due to cough and diarrhea, consulted for depression and voicing homicidal ideation towards his cousin. The patient is partially oriented, inattentive, with disorganized thought process, and fluctuating in mental status between alert and attentive (on primary team's interview) to drowsy and inattentive (on our interview). He is endorsing ideation, plan, and intent to kill is cousin, if he were to find out that his delaney COVID-19 was due to cousin. He endorses AH and VH but is unable to give details, but is unable to provide any details. He also has a hsitory of depression and has been non-adherent to medications for an unknown amount of time. Utox is pending, but he denies substance use. He endorsed SI without plan to primary team.  He has slept little in the last few days, but unable to get more detail about sleep. Assessment is limited due to patient's mental status and lack of collateral information.    50M, hx depression and anxiety, former heroin use, in OPD psychiatry (prescribed Lexapro, non-adherent), hx SA many years ago, no known PMH, BIBEMS activated by self due to cough and diarrhea, consulted for depression and voicing homicidal ideation towards his cousin. The patient is partially oriented, inattentive, with disorganized thought process, and fluctuating in mental status between alert and attentive (on primary team's interview) to drowsy and inattentive (on our interview). He is endorsing ideation, plan, and intent to kill is cousin, if he were to find out that his delaney COVID-19 was due to cousin. He endorses AH and VH but is unable to give details, but is unable to provide any details. He also has a hsitory of depression and has been non-adherent to medications for an unknown amount of time. Utox is pending, but he denies substance use. He endorsed SI without plan to primary team.  He has slept little in the last few days, but unable to get more detail about sleep. Assessment is limited due to patient's mental status and lack of collateral information.    50M, hx depression and anxiety, former heroin use, in OPD psychiatry (prescribed Lexapro, non-adherent), hx SA many years ago, no known PMH, BIBEMS activated by self due to cough and diarrhea, consulted for depression and voicing homicidal ideation towards his cousin. The patient is partially oriented, inattentive, with disorganized thought process, and fluctuating in mental status between alert and attentive (on primary team's interview) to drowsy and inattentive (on our interview). He is endorsing ideation, plan, and intent to kill is cousin, if he were to find out that his delaney COVID-19 was due to cousin. He endorses AH and VH but is unable to give details, but is unable to provide any details. He also has a hsitory of depression and has been non-adherent to medications for an unknown amount of time. Utox is pending, but he denies substance use. He endorsed SI without plan to primary team.  He has slept little in the last few days, but unable to get more detail about sleep.    1)Admit to [place] inpatient psych unit for COVID patients on 2pc.    2)Haldol 2 mg q6h prn agitation PO/IM/IV.     3)Can restart lexapro 10 mg daily and give risperdal 1 mg qhs.

## 2020-05-27 NOTE — ED PROVIDER NOTE - ATTENDING CONTRIBUTION TO CARE
49 y/o M with PMHx of depression (Rx'ed Lexapro but noncompliant, follows with Dr. Boone, [facility]), left eye injury with pupil deformity presents with concern for COVID given symptoms of cough and SOB. Pt concerned that his cousin who he works with at a [employer] gave him COVID, and states that he is going to kill his cousin because of this. On exam, pt appears well, non-toxic, afebrile. O2sat 98%, RRR, lungs clear, abdominal soft. ECG, CXR WNL. Labs noted- Covid 19 positive. 1:1 placed. Pt seen by psych in ED and to be admitted for homicidal ideation.  IM haldol given for agitation. 2PC paperwork completed for involuntary admission. Stable in ED.

## 2020-05-27 NOTE — ED BEHAVIORAL HEALTH ASSESSMENT NOTE - DIFFERENTIAL
Delirium due to medical causes vs. sedative intoxication vs. acute worsening of chronic psychiatric illness due to psychosocial stressors, medication non-adherence, and/or lack of sleep Consider MDD with psychotic features, consider Delirium vs. sedative intoxication vs. acute worsening of chronic psychiatric illness due to psychosocial stressors, medication non-adherence, and/or lack of sleep

## 2020-05-27 NOTE — ED PROVIDER NOTE - PROGRESS NOTE DETAILS
49 y/o M with PMHx depression (rx lexapro, has not been taking, follows with Dr. Yoder, [facility]) presenting with concern for COVID. Pt states that over the past weekend, he has been coughing. Pt also reporting abd pain w/ associated diarrhea which improved. Pt states that he is worried that he has COVID and "my cousin has it, he's been spreading it, and I'm going to kill him." Pt doesnot have a plan. Pt also reports feeling depressed, but has not been taking his Lexapro. Pt attributes SI, but denies any plan. No alcohol or drug usage. Pt's primary concern is that he has COVID and is reiterating that he is going to kill his cousin. Denies any fevers, chills, chest pain, palpitations, current nausea, vomiting, abd pain, or change in urinary symptoms. 49 y/o M with PMHx depression (rx lexapro, has not been taking, follows with Dr. Boone, [facility]) presenting with concern for COVID. Pt states that over the past weekend, he has been coughing. Pt also reporting abd pain w/ associated diarrhea which improved. Pt states that he is worried that he has COVID and "my cousin has it, he's been spreading it, and I'm going to kill him." Pt doesnot have a plan. Pt also reports feeling depressed, but has not been taking his Lexapro. Pt attributes SI, but denies any plan. No alcohol or drug usage. Pt's primary concern is that he has COVID and is reiterating that he is going to kill his cousin. Denies any fevers, chills, chest pain, palpitations, current nausea, vomiting, abd pain, or change in urinary symptoms. Pt COVID positive. Psych spoke with pt, and expressing concern for possible delirium, falling asleep on exam. Discussed with psych that on my own exam, pt is alert and oriented, not falling asleep, holding conversation, and did not endorse any illicit drug usage. Plan for CT head and urine studies. Psych would like to admit pt given further evaluation. [facility]: medically cleared and signed out to me pending psych placement. PT currently sleeping comfortably in bed. no resp distress. awaiting psych placement. Carolann: right after I left room, pt woke up and was trying to leave. will give meds for pt safety. Klepfish: pt had received 2 haldol and 1 ativan earlier. pt now w/ increasing agitation, increasing aggressive behavior (punching computer), refusing to wear mask (COVID +). Will give 5/1/50 for pt/staff safety.   PA had d/w CM and pscyh: Pt has bed at [facility] but unable to be transferred yet as the psychiatrist that evalautes pt in ED needs to be the same one that's there on his arrival. Requesting re-eval by tele psych ~2100. Carolann: Accepting physician will likely be Dr. Riojas at [facility]. transfer/admission paperwork filled out. 4 point restraint order filled out. awaiting psych re-eval. Klepfish: Pt sedated/sleeping comfortably. no resp distress. rediscussed w/ tele psych. accepted to [facility].

## 2020-05-27 NOTE — ED BEHAVIORAL HEALTH ASSESSMENT NOTE - ESTIMATED INTELLIGENCE
Physician Attestation for Scribe:  I, Erin Brayd MD, personally performed the services described in this documentation. All medical record entries made by the scribe were at my direction and in my presence.  I have reviewed this note and agree that the record reflects my personal performance and is accurate and complete.     Hospital Medicine  Progress Note     Patient Name: Aba Mccormick  MRN: [id number]  Team: [facility] MED D Erin Brady MD  Admit Date: 6/22/2018  GABBY 7/5/2018  Code status: Full Code     Principal Problem:  Acute respiratory failure with hypoxia     Interval history: Significantly improved sense of well-being and appetite on high dose Solumedrol. O2 requirements decrease.      Review of Systems   Constitutional: Negative for fever.   Cardiovascular: Negative for chest pain.         Physical Exam:  Temp:  [97.4 °F (36.3 °C)-98 °F (36.7 °C)]   Pulse:  [61-83]   Resp:  [16-20]   BP: (121-143)/(64-84)   SpO2:  [88 %-96 %]       Temp: 97.4 °F (36.3 °C) (06/29/18 0823)  Pulse: 62 (06/29/18 0902)  Resp: 18 (06/29/18 0902)  BP: 121/64 (06/29/18 0823)  SpO2: (!) 93 % (06/29/18 0823)     Intake/Output Summary (Last 24 hours) at 06/29/18 0912  Last data filed at 06/29/18 0400    Gross per 24 hour   Intake                0 ml   Output             2115 ml   Net            -2115 ml      Weight: 92.6 kg (204 lb 2.3 oz)  Body mass index is 29.29 kg/m².     Physical Exam   Constitutional: No distress.   Eyes: Conjunctivae and lids are normal.   Cardiovascular: S1 normal and S2 normal.    Pulmonary/Chest: Effort normal. He has rales.   Abdominal: Soft. Bowel sounds are normal. There is no tenderness.   Musculoskeletal: He exhibits no edema.   Psychiatric: Mood and affect normal.         Significant Labs:     Recent Labs  Lab 06/26/18  0447 06/27/18  0451 06/28/18  0619 06/29/18  0519   WBC 22.79* 19.34* 21.02* 20.50*   HGB 11.8* 12.0* 12.6* 12.2*   HCT 36.1* 36.6* 38.0* 37.2*   * 349 326  294         Recent Labs  Lab 06/22/18  1230   06/27/18  0451 06/28/18  0619 06/29/18  0519     < > 142 143 143   K 4.1  < > 3.9 3.5 4.1     < > 104 103 104   CO2 22*  < > 24 24 22*   BUN 46*  < > 118* 112* 119*   CREATININE 2.9*  < > 4.0* 3.8* 3.7*   *  < > 248* 241* 326*   CALCIUM 8.9  < > 9.2 9.8 9.3   MG  --   < > 2.8* 2.8* 2.7*   PHOS  --   < > 5.5* 5.3* 5.5*   ALKPHOS 93  --   --   --   --    ALT 14  --   --   --   --    AST 13  --   --   --   --    ALBUMIN 2.9*  --   --   --   --    PROT 8.2  --   --   --   --    BILITOT 1.2*  --   --   --   --    INR 1.1  --   --   --   --    < > = values in this interval not displayed.     Recent Labs  Lab 06/27/18 2032 06/28/18  0737 06/28/18  1149 06/28/18  1657 06/28/18 2034 06/29/18  0758   POCTGLUCOSE 300* 270* 268* 210* 274* 356*      A1C:      Recent Labs  Lab 06/19/18  1808 06/23/18  0314   HGBA1C 6.0* 6.3*        Recent Labs  Lab 06/22/18  1230 06/29/18  0520   CPK  --  193   TROPONINI 0.008  --      TSH:      Recent Labs  Lab 06/19/18 1808   TSH 3.183      Inpatient Medications prescribed for management of current Problems:   Scheduled Meds:    albuterol-ipratropium  3 mL Nebulization Q6H    ciprofloxacin HCl  500 mg Oral Q12H    diltiaZEM  60 mg Oral Q6H    fluticasone-vilanterol  1 puff Inhalation Daily    insulin aspart U-100  12 Units Subcutaneous TIDWM    insulin detemir U-100  10 Units Subcutaneous QHS    methylPREDNISolone (SOLU-Medrol) IVPB (doses > 250 mg)   Intravenous Once    metoprolol tartrate  25 mg Oral Q6H    pantoprazole  40 mg Oral Daily    [START ON 6/30/2018] predniSONE  60 mg Oral Daily    senna-docusate 8.6-50 mg  1 tablet Oral BID    sevelamer carbonate  800 mg Oral TID WM      Continuous Infusions:   As Needed: acetaminophen, dextrose 50%, dextrose 50%, glucagon (human recombinant), glucose, glucose, insulin aspart U-100, ondansetron, sodium chloride, sodium chloride 0.9%           Active Hospital Problems      Diagnosis   POA    *Acute respiratory failure with hypoxia [J96.01]   Yes    ANCA-associated vasculitis [I77.6]   Unknown    Acute on chronic diastolic (congestive) heart failure [I50.33]   Yes    Monoclonal paraproteinemia [D47.2]   Yes    Sepsis with organ dysfunction [A41.9, R65.20]   Yes    Goals of care, counseling/discussion [Z71.89]   Not Applicable    Hospital-acquired pneumonia [J18.9]   Yes    Pseudomonas pneumonia [J15.1]   Yes    Interstitial lung disease [J84.9]   Yes    CKD (chronic kidney disease), stage IV [N18.4]   Yes    Atypical atrial flutter [I48.4]   Yes    Normocytic anemia [D64.9]   Yes    HELLEN on CKD  [N17.9]   Yes    Diabetes mellitus, type 2 [E11.9]   Yes       Resolved Hospital Problems     Diagnosis Date Resolved POA   No resolved problems to display.         Overview:  81 year old male on background significant for DM II, 2:1 AF, stage 4 CKD who was recently discharged from [facility]- admission after presenting for dyspnea and found to have have AF and who then presented here at [facility] for persistant dyspnea. Patient was admitted with Acute on Chronic diastolic heart failure and Acute hypoxemic respiratory failure with hypoxia. He had leukocytosis and in the ED, the patient required 14 LPM with 55% FiO2 on Ventimask. He was diuresed and treated with Ceftriaxone and Azithromycin. Pulmonology was consulted and ultimately recommended to continue on Solumedrol and Duonebs for suspected Nonspecific Interstitial pneumonia vs. Usual interstitial pneumonia. Respiratory cultures grew Pseudomonas aeruginosa and he was treated with Ciprofloxacin (starting 6/26). Cardiology was also consulted and recommended further diuresis due to continued O2 needs on Comfort flow and furosemide was eventually discontinued on 6/27 when FiO2 requirements improved. Nephrology was consulted for his HELLEN and proteinuria. Nephrology initially suspected Acute Glomerular nephritis or ischemic Acute Tubular  nephrosis due to hypoperfusion from his A-flutter and Paraproteinemia; Heme-onc was consulted and work-up was suspicious for Multiple Myeloma which was recommended to be further worked-up as an outpatient. Per Nephrology workup, patient labs are ANCA and MPO positive indicating an underlying vasculitis and they are proceeding with renal bx once patient's respiratory status stabilizes. His anticoagulation was held for renal bx and Rheumatology was consulted.      Assessment and Plan for Problems addressed today:     Acute on Chronic diastolic heart failure  Acute hypoxemic respiratory failure with hypoxia   Possible Hospital associated Pneumonia due to Pseudomonas aeruginosa vs. colonization  Sepsis with organ dysfunction   Interstitial Lung Disease / NSIP, Concern for vasculitis  · Suspected Usual interstitial pneumonia on CT (6/22)  · On 14 L 55% venti mask in ED; ICU evaluated patient and deemed ok for the floor  · Started Solumedrol 125 mg IV q8, lasix 80 mg IV BID in ED   · WBC: 15.67 > 16.6 (6/23); Given Ceftriaxone and Azithromycin in ED  · Cardiology consulted: Believe patient is clinically fluid overloaded. 1500 Fluid restriction; continued with IV Lasix   · Pulmonology agreed with cardiology to diurese. His Interstitial lung disease is most suggestive of Non-specific Interstitial Pneumonia. Antibiotics de-escalated to Doxycycline (6/23) and continued Highflow O2; Daily weights; PT/OT   · Per Pulmonology, restarted IV solumedrol and started Breo due to little improvement in oxygenation despite adequate diuresis. Per Cardiology recommendations, reduced Furosemide to PO 40mg BID.  · Repeat CXR: No change. BNP, improved 662>227. Started Duonebs and weaning O2: on 55% O2 comfort flow. Continuing on Solumedrol; reducing frequency of Furosemide to 40mg, daily due to concern for increasing Creatinine (6/25)   · Respiratory cx (6/26): Pseudomonas aeruginosa, pan-sensitive; Changed antibiotic coverage to  Ciprofloxacin 500mg, BID (6/26)  · Discontinued Lasix (6/28).     Leukocytosis, improving  · WBC 24.43 (6/24). No corresponding SIRS criteria. Continuing to monitor. Could be due to Solumedrol.      Atypical Atrial Flutter with rapid rate  Essential HTN  · Echo (6/19): EF 55-60%; PAP 36mmHg; Normal L/R systolic function  · Restarted Carvedilol and Diltiazem  · Continued Eliquis   · Cardiology recommends f/u outpatient with RFA or DCCV   · Adjusted Metoprolol to 25 mg Q6Hr and Diltiazem to Q6Hr 60 mg due to increased HR to 132.  · Holding apixaban 6/28 for Kidney biopsy.     HELLEN on ? CKD, Stage IV  Proteinuria  · Reported daily Naproxen use  · Cr 2.8-3.0 since previous admission; suspect proteinuria is chronic and not acute   · Consulted Nephrology: Recommended Heme consult and f/u RFP and urine studies. Glory, UOsm; Renal/retroperitoneal USG. Suspecting iATN due to hypoperfusion from A-flutter and Paraproteinemia.  · Started Sevelamer 800mg TID (6/27)  · ANCA and MPO elevated suggesting a type of vasculitis. Nephrology plans to proceed with renal bx on Monday (7/2) to confirm diagnosis. Rheumatology consulted.       Paraproteinemia   · 6/21/18 SPEP identified a monoclonal paraproteinemia.  · Heme/onc consulted: Ordered: UPEP/WILLOW, b2 microglobulin, LDH, Free light chains, Quantitative Igs, Considering long bone survey. Multiple myeloma workup. Bone marrow biopsy as outpatient. Currently likely not cause of HELLEN  · IgG, B2 Microglobulin (6/24) elevated: 2117 and 9, respectively      DM2 with nephropathy and HTN  Steroid induced hyperglycemia  · HgA1c (6/23): 6.3%  · Continued with moderate SSI  · Started Aspart 5U before meals and Detemir 10U QHS for increase in Blood glucose secondary to Solumedrol  · Increased Apart to 15U with meals (6/29)        Diet:  Diabetic   GI PPx: Pantoprazole 40mg   DVT Prophylaxis: Holding anticoagulation        Anticoagulants   Medication Route Frequency      Lines/ Drains/  Airways:  Peripheral IV: Left forearm  External Urinary catheter      Wounds: None     Discharge plan and follow up  Home or Self Care        Provider  Erin Brady MD  [facility] MED D   Department of Hospital Medicine     Yusef Attestation: I personally scribed for Erin Brady MD on 06/29/2018 at 2:27 PM. Electronically signed by yusef Shelton on 06/29/2018 at 2:27 PM.           Average

## 2020-05-27 NOTE — ED PROVIDER NOTE - NSRISKOFTRANSFER_ED_A_ED
Death or Disability/Increased Pain/Transportation Risk (There is always a risk of traffic delays resulting in deterioration of condition.)/Deterioration of Condition En Route

## 2020-05-27 NOTE — ED ADULT TRIAGE NOTE - CHIEF COMPLAINT QUOTE
pt c/o suicidal ideation, depression, chest pressure x 1 hour. also states, "I want to hurt my cousin." 1:1 initiated in triage.

## 2020-05-27 NOTE — ED ADULT NURSE NOTE - NS PRO PASSIVE SMOKE EXP
OP Anticoagulation Service Note    Date: 7/3/2019  Anticoagulation Summary  As of 7/3/2019    INR goal:   2.0-3.0   TTR:   25.5 % (8.9 mo)   INR used for dosin.80 (7/3/2019)   Warfarin maintenance plan:   10 mg (5 mg x 2) every Mon, Wed, Fri; 8 mg (1 mg x 3 and 5 mg x 1) all other days   Weekly warfarin total:   62 mg   No change documented:   Palmer Villeda Ass't   Plan last modified:   Terrance Miller, Pharmacy Intern (3/12/2019)   Next INR check:   2019   Priority:   Acute   Target end date:   Indefinite    Indications    DVT (deep venous thrombosis) ([facility]) [I82.409]  Chronic anticoagulation [Z79.01]             Anticoagulation Episode Summary     INR check location:       Preferred lab:       Send INR reminders to:       Comments:         Anticoagulation Care Providers     Provider Role Specialty Phone number    Florentino Saunders M.D. Referring Cardiology [phone]    [facility] Anticoagulation Services Responsible  [phone]        Anticoagulation Patient Findings  Patient Findings     Negatives:   Signs/symptoms of thrombosis, Signs/symptoms of bleeding, Laboratory test error suspected, Change in health, Change in alcohol use, Change in activity, Upcoming invasive procedure, Emergency department visit, Upcoming dental procedure, Missed doses, Extra doses, Change in medications, Change in diet/appetite, Hospital admission, Bruising, Other complaints        Plan: Left patient a message. Patient is therapeutic and will remain on the same dose. Patient was instructed to call back if needed to report any unusual bleeding or bruising or any changes to medication or diet. Patient is to be checked again in 2 weeks.    Palmer Vlileda. Ass't  [facility]    I have reviewed and am in agreement with the above stated plan on 7-3-19.  Rommel Randall, PharmD, BCACP     Unknown

## 2020-05-27 NOTE — ED PROVIDER NOTE - CLINICAL SUMMARY MEDICAL DECISION MAKING FREE TEXT BOX
49 y/o M with PMHx of depression (rx'ed Lexapro but noncompliant, follows with Dr. Boone, [facility]) presentign with concern for COVID given symptoms of cough and SOB. Pt concerned that his cousin who he works with at a [employer] gave him COVID, and states that he is going to kill his cousin because fo this. On exam, pt appears well, non-toxic, afebrile. O2sat 98%, HRRR, lungs clear, abdominal soft. Noted irregularity of left pupil. Plan labs, EKG, CXR and psych eval. 49 y/o M with PMHx of depression (rx'ed Lexapro but noncompliant, follows with Dr. Boone, [facility]) presentign with concern for COVID given symptoms of cough and SOB. Pt concerned that his cousin who he works with at a [employer] gave him COVID, and states that he is going to kill his cousin because fo this. On exam, pt appears well, non-toxic, afebrile. O2sat 98%, HRRR, lungs clear, abdominal soft. Noted irregularity of left pupil. Plan labs, EKG, CXR and psych eval. psychiatry concern for patient's presentation and recommend hospitalization, involuntary. pt is aware of covid results, and states he would like to kill his cousin. no current covid psychiatry beds at this facility, therefore plan to transfer to [place] who has capacity for treatment. pt is aware. pt intermittantly agitated and given haldol as well as ativan. 51 y/o M with PMHx of depression (rx'ed Lexapro but noncompliant, follows with Dr. Boone, [facility]) presentign with concern for COVID given symptoms of cough and SOB. Pt concerned that his cousin who he works with at a [employer] gave him COVID, and states that he is going to kill his cousin because of this. On exam, pt appears well, non-toxic, afebrile. O2sat 98%, HRRR, lungs clear, abdominal soft. Noted irregularity of left pupil. Plan labs, EKG, CXR and psych eval. psychiatry concern for patient's presentation and recommend hospitalization, involuntary. pt is aware of covid results, and states he would like to kill his cousin. no current covid psychiatry beds at this facility, therefore plan to transfer to [place] who has capacity for treatment. pt is aware. pt intermittantly agitated and given haldol as well as ativan. 51 y/o M with PMHx of depression (rx'ed Lexapro but noncompliant, follows with Dr. Boone, [facility]) presentign with concern for COVID given symptoms of cough and SOB. Pt concerned that his cousin who he works with at a [employer] gave him COVID, and states that he is going to kill his cousin because of this. On exam, pt appears well, non-toxic, afebrile. O2sat 98%, HRRR, lungs clear, abdominal soft. Noted irregularity of left pupil. Plan labs, EKG, CXR and psych eval. psychiatry concern for patient's presentation and recommend hospitalization, involuntary. pt is aware of covid results, and states he would like to kill his cousin. no current covid psychiatry beds at this facility, therefore plan to transfer to [place] who has capacity for treatment. pt is aware. pt intermittently agitated and given haldol as well as ativan.

## 2020-05-27 NOTE — ED PROVIDER NOTE - PHYSICAL EXAMINATION
General: Patient is well developed and well nourised. Patient is alert and oriented to person, place and date. Patient is laying comfortably in stretcher and appears in no acute distress.  HEENT: Head is normocephalic and atraumatic. Pupils are equal, round and reactive to light and accommodation. Extraocular movements intact. No evidence of nystagmus, conjunctival injection, or scleral icterus. External ears symmetric and non-tender without evidence of discharge. Ear canals are clear without evidence of edema or erythema. Cone of light evident on tympanic membrade without evidence of erythema, retraction or bulge. No hemotympanum present bilaterally.  Nose is symmetric, non-tender, patent without evidence of discharge. Teeth in good repair. Uvula midline. Pharynx without erythema, edema, tonsillar enlargement or exudates.   Neck: Supple and nontender, with no evidence of lymphadenopathy. No cervical spinal tenderness. Full range of motion.  Heart: Regular rate and rhythm. No murmurs, rubs or gallops.   Lungs: Clear to auscultation bilaterally with equal chest expansion. No note of wheezes, rhonchi, rales. Equal chest expansion. No note of retractions.  Abdomen: Bowel sounds present in all four quadrants. Soft, non-tender, non-distended without signs of masses, rebound or guarding. No note of hepatosplenomegaly. No CVA tenderness bilaterally. Negative Weaver sign. No pain present over McBurney's point.  Musculoskeletal: No edema, erythema, ecchymosis, atrophy or deformity. Full range of motion in all four extremities.  No clubbing or cyanosis. No point tenderness to palpation.   Neuro: Cranial nerves intact. GCS 15. Moving all extremities without discomfort. Sensation intact. Gait steady  Skin: Warm, dry and intact without evidence of rashes, bruising, pallor, jaundice or cyanosis.   Psych: Mood and affect appropriate.

## 2020-05-27 NOTE — ED BEHAVIORAL HEALTH ASSESSMENT NOTE - RISK ASSESSMENT
delirium, depression, non-adherence to medication, psychosocial stressors related to COVID, acute medical illness Moderate Acute Suicide Risk delirium, depression, non-adherence to medication, psychosocial stressors related to COVID, acute medical illness, high risk of danger to cousin

## 2020-05-28 VITALS — RESPIRATION RATE: 14 BRPM | OXYGEN SATURATION: 99 % | HEIGHT: 73 IN | TEMPERATURE: 98 F | WEIGHT: 173.94 LBS

## 2020-05-28 DIAGNOSIS — F33.3 MAJOR DEPRESSIVE DISORDER, RECURRENT, SEVERE WITH PSYCHOTIC SYMPTOMS: ICD-10-CM

## 2020-05-28 DIAGNOSIS — U07.1 COVID-19: ICD-10-CM

## 2020-05-28 DIAGNOSIS — F11.21 OPIOID DEPENDENCE, IN REMISSION: ICD-10-CM

## 2020-05-28 DIAGNOSIS — F32.9 MAJOR DEPRESSIVE DISORDER, SINGLE EPISODE, UNSPECIFIED: ICD-10-CM

## 2020-05-28 DIAGNOSIS — Z91.19 PATIENT'S NONCOMPLIANCE WITH OTHER MEDICAL TREATMENT AND REGIMEN: ICD-10-CM

## 2020-05-28 PROCEDURE — 99222 1ST HOSP IP/OBS MODERATE 55: CPT

## 2020-05-28 PROCEDURE — 99232 SBSQ HOSP IP/OBS MODERATE 35: CPT

## 2020-05-28 RX ORDER — TRAZODONE HCL 50 MG
150 TABLET ORAL AT BEDTIME
Refills: 0 | Status: DISCONTINUED | OUTPATIENT
Start: 2020-05-28 | End: 2020-05-30

## 2020-05-28 RX ORDER — DIPHENHYDRAMINE HCL 50 MG
50 CAPSULE ORAL EVERY 6 HOURS
Refills: 0 | Status: DISCONTINUED | OUTPATIENT
Start: 2020-05-28 | End: 2020-06-11

## 2020-05-28 RX ORDER — RISPERIDONE 4 MG/1
1 TABLET ORAL AT BEDTIME
Refills: 0 | Status: DISCONTINUED | OUTPATIENT
Start: 2020-05-28 | End: 2020-05-29

## 2020-05-28 RX ORDER — ESCITALOPRAM OXALATE 10 MG/1
10 TABLET, FILM COATED ORAL DAILY
Refills: 0 | Status: DISCONTINUED | OUTPATIENT
Start: 2020-05-28 | End: 2020-05-30

## 2020-05-28 RX ORDER — MAGNESIUM HYDROXIDE 400 MG/1
30 TABLET, CHEWABLE ORAL DAILY
Refills: 0 | Status: DISCONTINUED | OUTPATIENT
Start: 2020-05-28 | End: 2020-06-11

## 2020-05-28 RX ORDER — HALOPERIDOL DECANOATE 100 MG/ML
2 INJECTION INTRAMUSCULAR EVERY 6 HOURS
Refills: 0 | Status: DISCONTINUED | OUTPATIENT
Start: 2020-05-28 | End: 2020-06-05

## 2020-05-28 RX ORDER — ASCORBIC ACID 60 MG
500 TABLET,CHEWABLE ORAL
Refills: 0 | Status: DISCONTINUED | OUTPATIENT
Start: 2020-05-28 | End: 2020-06-11

## 2020-05-28 RX ORDER — ALBUTEROL 90 UG/1
2 AEROSOL, METERED ORAL EVERY 6 HOURS
Refills: 0 | Status: DISCONTINUED | OUTPATIENT
Start: 2020-05-28 | End: 2020-06-11

## 2020-05-28 RX ORDER — ACETAMINOPHEN 500 MG
650 TABLET ORAL EVERY 6 HOURS
Refills: 0 | Status: DISCONTINUED | OUTPATIENT
Start: 2020-05-28 | End: 2020-06-11

## 2020-05-28 RX ORDER — HALOPERIDOL DECANOATE 100 MG/ML
5 INJECTION INTRAMUSCULAR EVERY 6 HOURS
Refills: 0 | Status: DISCONTINUED | OUTPATIENT
Start: 2020-05-28 | End: 2020-06-05

## 2020-05-28 RX ADMIN — ESCITALOPRAM OXALATE 10 MILLIGRAM(S): 10 TABLET, FILM COATED ORAL at 17:55

## 2020-05-28 RX ADMIN — Medication 50 MILLIGRAM(S): at 23:22

## 2020-05-28 RX ADMIN — Medication 500 MILLIGRAM(S): at 21:20

## 2020-05-28 RX ADMIN — HALOPERIDOL DECANOATE 2 MILLIGRAM(S): 100 INJECTION INTRAMUSCULAR at 23:22

## 2020-05-28 RX ADMIN — Medication 1 MILLIGRAM(S): at 17:56

## 2020-05-28 RX ADMIN — Medication 150 MILLIGRAM(S): at 21:20

## 2020-05-28 RX ADMIN — Medication 1 MILLIGRAM(S): at 23:22

## 2020-05-28 RX ADMIN — RISPERIDONE 1 MILLIGRAM(S): 4 TABLET ORAL at 21:20

## 2020-05-28 NOTE — PROGRESS NOTE BEHAVIORAL HEALTH - NSBHADDHXPSYCHFT_PSY_A_CORE
Pt h/o treatment noncompliance for recurrent psychotic depression  Pt reported a h/o one prior suicide attempt years ago.  Pt most recently in outpatient treatment with Dr. Boone at [facility]  but pt reportedly noncompliant due to financial issues and current homelessness.

## 2020-05-28 NOTE — PROGRESS NOTE BEHAVIORAL HEALTH - NSBHCHARTREVIEWIMAGING_PSY_A_CORE FT
MEDICATIONS  (STANDING):  ascorbic acid 500 milliGRAM(s) Oral two times a day  escitalopram 10 milliGRAM(s) Oral daily  risperiDONE   Tablet 1 milliGRAM(s) Oral at bedtime    MEDICATIONS  (PRN):  acetaminophen   Tablet .. 650 milliGRAM(s) Oral every 6 hours PRN Temp greater or equal to 38C (100.4F), Mild Pain (1 - 3), Moderate Pain (4 - 6)  ALBUTerol    90 MICROgram(s) HFA Inhaler 2 Puff(s) Inhalation every 6 hours PRN Shortness of Breath and/or Wheezing  aluminum hydroxide/magnesium hydroxide/simethicone Suspension 30 milliLiter(s) Oral every 6 hours PRN Dyspepsia  benzonatate 100 milliGRAM(s) Oral three times a day PRN Cough  diphenhydrAMINE 50 milliGRAM(s) Oral every 6 hours PRN Extrapyramidal prophylaxis/anxiety/agitation due to psychosis  diphenhydrAMINE   Injectable 50 milliGRAM(s) IntraMuscular every 6 hours PRN Extrapyramidal prophylaxis/psychosis related severe agitation/ aggression  haloperidol     Tablet 2 milliGRAM(s) Oral every 6 hours PRN agitation  haloperidol    Injectable 5 milliGRAM(s) IntraMuscular every 6 hours PRN severe psychosis related aggression  LORazepam     Tablet 1 milliGRAM(s) Oral every 6 hours PRN moderate psychois related aggression.anxiety  LORazepam   Injectable 2 milliGRAM(s) IntraMuscular every 6 hours PRN severe psychosis related agitation/aggression  magnesium hydroxide Suspension 30 milliLiter(s) Oral daily PRN Constipation

## 2020-05-28 NOTE — PROGRESS NOTE BEHAVIORAL HEALTH - NSBHFUPINTERVALHXFT_PSY_A_CORE
Pt seen by writer via ZOOM remote laptop  with 5N staff present with the pt on the unit. Pt appeared hyperalert and with good eye contact. In hospital attire with fair grooming and attention to his ADLs. Pt  chatty and overly social with an irritable edge underlying surface cheerfulness. Pt alert and oriented x 3. In no acute distress. Pt mood " OK. "  Affect somewhat elated . Asked why he was admitted to hospital, pt stated, " I got tired of living in hotels. 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 A lot of people have said I owed them money. I went to the hospital for diarrhea and a cough and I was very tired which I usually am not." Pt with reported h/o decreased need for sleep and no sleep x the past few days prior to admission. Pt with rapid near pressured speech difficult to interrupt at times. Pt inappropriately blase as to  his current situation and as to his reported HI with threats towards his cousin who owns the [employer] where the pt has worked x 25 years. Pt stated, " I know I can't kill him. I know I would end up in [facility]." Pt currently denies SI /HI /AH /VH but did report AH / VH within the past 24 hours as above. Judgment is markedly impaired with virtually no insight into his illness and the need for ongoing consistent treatment.

## 2020-05-28 NOTE — PROGRESS NOTE BEHAVIORAL HEALTH - NSBHADDHXPSYSOCFT_PSY_A_CORE
Pt was born and raised in [place] . He completed high school there but no college. Pt came to the [place]  [place] years ago  and had been living in [place]  on [address] with his wife and 3 children. The pt  and his wife  after 20 years of marriage. 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  Pt reported a long h/o antagonism between him and this cousin.

## 2020-05-28 NOTE — PROGRESS NOTE BEHAVIORAL HEALTH - NSBHFUPADDHPIFT_PSY_A_CORE
The pt. is a 50 year-old  WM with a h/o recurrent psychotic depression, remote severe opioid use d/o ( none x 20 years),and  treatment nonadherence who was BIB EMS to [facility] on 5/27/2020 , activated by the pt due to pt c/o cough and diarrhea  x 2 weeks.  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    When initially evaluated in the ED at [facility] on 5/27/2020, the   patient was only partially oriented, inattentive, with disorganized thought process, and fluctuating in mental status between alert and attentive (on primary team's interview) to drowsy and inattentive (on our interview). Also at that time, the pt was endorsing ideation, plan, and intent to kill is cousin, if he were to find out that his delaney COVID-19 was due to cousin.        Additionally, the pt was found to be COVID+ on 5/27/2020 and as a result, the pt was transferred on 5/27/2020 on a 2 PC involuntary legal status  to [address] , a designated all COVID = psychiatric unit for ongoing evaluation and treatment of his psychotic depression along with supportive care for his recently confirmed COVID . A SAFE ACT report will be made due to the pt's HI towards his cousin as above in light of the pt's recurrent psychotic depression and a h/o entrenched treatment noncompliance.

## 2020-05-28 NOTE — PROGRESS NOTE BEHAVIORAL HEALTH - NSBHCHARTREVIEWVS_PSY_A_CORE FT
Vital Signs Last 24 Hrs  T(C): 36.4 (28 May 2020 09:30), Max: 36.7 (28 May 2020 00:27)  T(F): 97.5 (28 May 2020 09:30), Max: 98.1 (28 May 2020 00:27)  HR: 85 (27 May 2020 20:55) (85 - 85)  BP: 107/59 (27 May 2020 20:55) (107/59 - 107/59)  BP(mean): --  RR: 16 (28 May 2020 09:30) (14 - 18)  SpO2: 100% (28 May 2020 09:30) (95% - 100%)

## 2020-05-28 NOTE — CONSULT NOTE ADULT - SUBJECTIVE AND OBJECTIVE BOX
HPI:  50M w/no sig PMH, PPsH depression, anxiety, presented to ED w/ c/o cough, diarrhea and stated that he's going to kill his cousin for giving him Covid.  Psych consulted , pt admitted to [facility].  We've been consulted for  medical H&P, medical management of +COVID.  Currently, pt feels drowsy, but arousable and answering Qs.  Denies any cough, sob, fever, n/v/d, dysuria.        PAST MEDICAL & SURGICAL HISTORY:  Depression  No significant past surgical history      Review of Systems:   CONSTITUTIONAL: No fever.  EYES: No eye pain or discharge.  ENMT:  No sinus or throat pain  NECK: No pain or stiffness  RESPIRATORY: No cough, wheezing, chills or hemoptysis; No shortness of breath  CARDIOVASCULAR: No chest pain, palpitations, dizziness, or leg swelling  GASTROINTESTINAL: No abdominal or epigastric pain. No nausea, vomiting, or hematemesis; No diarrhea or constipation. No melena or hematochezia.  GENITOURINARY: No dysuria or incontinence  NEUROLOGICAL: No headaches, memory loss, loss of strength, numbness, or tremors  SKIN: No rashes.  MUSCULOSKELETAL: No joint pain or swelling; No muscle, back, or extremity pain  PSYCHIATRIC: ++ depression, ++anxiety,       Allergies    No Known Allergies      Social History:   h/o heroin use 20 yrs ago    FAMILY HISTORY:  unknown to pt     Home Medications:  n/a     MEDICATIONS  (PRN):  haloperidol     Tablet 2 milliGRAM(s) Oral every 6 hours PRN agitation      PHYSICAL EXAM:  Vital Signs Last 24 Hrs  T(C): 36.4 (28 May 2020 09:30), Max: 36.7 (28 May 2020 00:27)  T(F): 97.5 (28 May 2020 09:30), Max: 98.1 (28 May 2020 00:27)  HR: 85 (27 May 2020 20:55) (85 - 98)  BP: 107/59 (27 May 2020 20:55) (107/59 - 151/99)  BP(mean): --  RR: 16 (28 May 2020 09:30) (14 - 18)  SpO2: 100% (28 May 2020 09:30) (95% - 100%)  GENERAL: NAD, well-developed  HEAD:  Atraumatic, Normocephalic  EYES: EOMI, PERRLA, conjunctiva and sclera clear  ENT: normal hearing, no nasal discharge, throat clear, dentition poor  NECK: Supple, No JVD, no LAD, no thyromegaly   CHEST/LUNG: Clear to auscultation bilaterally; No wheeze, respirations unlabored  HEART: Regular rate and rhythm; No murmurs, rubs, or gallops  ABDOMEN: Soft, Nontender, Nondistended; Bowel sounds present, no HSM  EXTREMITIES:  2+ Peripheral Pulses, No clubbing, cyanosis, or edema  PSYCH: Ox3, interactive, cooperative  NEUROLOGY: non-focal, sensory and cn 2-12 intact, speech/language intact  SKIN: No visible rashes or lesions    LABS:                        13.5   6.49  )-----------( 234      ( 27 May 2020 09:50 )             39.7         137  |  103  |  8   ----------------------------<  89  3.5   |  22  |  0.87    Ca    9.3      27 May 2020 09:50    TPro  6.9  /  Alb  4.0  /  TBili  0.9  /  DBili  x   /  AST  27  /  ALT  62<H>  /  AlkPhos  67        CARDIAC MARKERS ( 27 May 2020 09:50 )  x     / <0.01 ng/mL / x     / x     / x          Urinalysis Basic - ( 27 May 2020 13:31 )    Color: Yellow / Appearance: Clear / S.025 / pH: x  Gluc: x / Ketone: Trace mg/dL  / Bili: Small / Urobili: 1.0 E.U./dL   Blood: x / Protein: Trace mg/dL / Nitrite: NEGATIVE   Leuk Esterase: NEGATIVE / RBC: < 5 /HPF / WBC < 5 /HPF   Sq Epi: x / Non Sq Epi: 0-5 /HPF / Bacteria: Present /HPF        RADIOLOGY & ADDITIONAL TESTS:    Imaging Personally Reviewed:  cxr neg  CTH no acute pathology    EKG Personally Reviewed:  nsr     Care Discussed with Consultants/Other Providers:  RN

## 2020-05-28 NOTE — CONSULT NOTE ADULT - PROBLEM SELECTOR RECOMMENDATION 9
pt is asymptomatic, CXR neg  monitor for fever, sob, other change in symptoms  supportive care  droplet/airborne precautions

## 2020-05-28 NOTE — PROGRESS NOTE BEHAVIORAL HEALTH - NSBHFUPINTERVALCCFT_PSY_A_CORE
" I came to the hospital because I wasn't feeling good for the past  2 weeks. I'm normally a workaholic but I felt tired and I had diarrhea. Yeah and I  was hearing voices and seeing things and I said I wanted to kill my cousin."    Note - On 5/28/2020, this writer filed a pt SAFE ACT report due to pt's HI  and SI  as above.

## 2020-05-28 NOTE — PROGRESS NOTE BEHAVIORAL HEALTH - NSBHADDHXFAMFT_PSY_A_CORE
The pt. moved to the  from [place] 28 years ago. He and his wife  had   been living in [place]  on [address] with his wife and 3 children. 2 daughters , one in college. One in . One son in college. The pt has 2 older brothers. Pt states he is close to the brother who still resides in [place].

## 2020-05-28 NOTE — PROGRESS NOTE BEHAVIORAL HEALTH - PROBLEM SELECTOR PLAN 1
1. Restart Lexapro 10 mg po q am  2.Restart Risperdal 1 mg po qhs  3.Ongoing staff support   4.Disposition planning  5.SAFE ACT report to be filed due to pt reported HI as above 1. Restart Lexapro 10 mg po q am  2.Restart Risperdal 1 mg po qhs  3.Trazodone 150 mg po qhs (sleep)  4.Ongoing staff support   5.Disposition planning  6.SAFE ACT report to be filed due to pt reported HI as above

## 2020-05-28 NOTE — PROGRESS NOTE BEHAVIORAL HEALTH - PRN MEDS
prn emergency IM medication in the ER at [facility] on 5/27/2020 due to severe psychotic depressive agitation

## 2020-05-28 NOTE — PROGRESS NOTE BEHAVIORAL HEALTH - PROBLEM SELECTOR PLAN 2
1.Ongoing staff support and encouragement  2.Ongoing pt psychoeducation related to the importance of ongoing consistent treatment to decrease risk of clinical relapse

## 2020-05-28 NOTE — PATIENT PROFILE BEHAVIORAL HEALTH - REASON FOR ADMISSION
Pt received from EMS sedated. Pt able to be aroused and answer basic questions, but quickly goes back to sleep. VSS, afebrile, no resp distress, o2 sat 99% on R/A. Pt aware that he is in a hospital but does not believe he in [place]. Vaguely aware of circumstances that led up to his hospitalization but does not wish to talk at present time and or is unable to stay awake long enough to have a meaningful conversation. Pt does deny HI/and SI at current time. cannot contract for safety, as the patient is too sedated to read and or sign contract.

## 2020-05-28 NOTE — PROGRESS NOTE BEHAVIORAL HEALTH - NSBHADDHXSUBSTFT_PSY_A_CORE
Pt reported a  remote h/o severe heroin and crack cocaine use disorders  from age 42 to age 46.   Pt with + IVDA heroin and cocaine  up to 10 bags a day   Pt h/o drug rehab  in [place]  and a h/o treatment with Suboxone x 4 years.  Pt denied any cravings for some time

## 2020-05-28 NOTE — CONSULT NOTE ADULT - ASSESSMENT
50M w/no sig PMH, PPsH depression, anxiety, presented to ED w/ c/o cough, diarrhea and stated that he's going to kill his cousin for giving him Covid.    We've been consulted for  medical H&P, medical management of +COVID.

## 2020-05-28 NOTE — PROGRESS NOTE BEHAVIORAL HEALTH - VIOLENCE RISK FACTORS:
Noncompliance with treatment/Lack of insight into violence risk/need for treatment/Affective dysregulation/Irritability

## 2020-05-28 NOTE — PROGRESS NOTE BEHAVIORAL HEALTH - DETAILS
recent non productive cough recent diarrhea ( likely COVID + associated0 recent diarrhea ( likely COVID + associated)

## 2020-05-29 LAB
CRP SERPL-MCNC: <0.1 MG/DL — SIGNIFICANT CHANGE UP (ref 0–0.4)
D DIMER BLD IA.RAPID-MCNC: <150 NG/ML DDU — SIGNIFICANT CHANGE UP
FERRITIN SERPL-MCNC: 83 NG/ML — SIGNIFICANT CHANGE UP (ref 30–400)
LDH SERPL L TO P-CCNC: 152 U/L — SIGNIFICANT CHANGE UP (ref 84–241)
PROCALCITONIN SERPL-MCNC: 0.03 NG/ML — SIGNIFICANT CHANGE UP (ref 0.02–0.1)

## 2020-05-29 PROCEDURE — 99232 SBSQ HOSP IP/OBS MODERATE 35: CPT

## 2020-05-29 RX ORDER — RISPERIDONE 4 MG/1
2 TABLET ORAL AT BEDTIME
Refills: 0 | Status: DISCONTINUED | OUTPATIENT
Start: 2020-05-29 | End: 2020-06-02

## 2020-05-29 RX ADMIN — Medication 150 MILLIGRAM(S): at 20:52

## 2020-05-29 RX ADMIN — HALOPERIDOL DECANOATE 2 MILLIGRAM(S): 100 INJECTION INTRAMUSCULAR at 23:23

## 2020-05-29 RX ADMIN — Medication 1 MILLIGRAM(S): at 23:23

## 2020-05-29 RX ADMIN — ESCITALOPRAM OXALATE 10 MILLIGRAM(S): 10 TABLET, FILM COATED ORAL at 09:20

## 2020-05-29 RX ADMIN — Medication 500 MILLIGRAM(S): at 20:52

## 2020-05-29 RX ADMIN — Medication 50 MILLIGRAM(S): at 21:38

## 2020-05-29 RX ADMIN — RISPERIDONE 2 MILLIGRAM(S): 4 TABLET ORAL at 20:52

## 2020-05-29 RX ADMIN — Medication 500 MILLIGRAM(S): at 09:20

## 2020-05-29 NOTE — PROGRESS NOTE BEHAVIORAL HEALTH - PROBLEM SELECTOR PLAN 1
1. Restart Lexapro 10 mg po q am  2.To increase  Risperdal dose to 2 mg po qhs  3.Trazodone 150 mg po qhs (sleep)  4.Ongoing staff support   5.Disposition planning  6.SAFE ACT report to be filed due to pt reported HI as above

## 2020-05-29 NOTE — PROGRESS NOTE BEHAVIORAL HEALTH - NSBHFUPINTERVALCCFT_PSY_A_CORE
" I feel ok I guess. I didn't sleep well last night.  I don't know."    Note - On 5/28/2020, this writer filed a pt SAFE ACT report due to pt's HI  and SI  as above.

## 2020-05-29 NOTE — PROGRESS NOTE BEHAVIORAL HEALTH - OTHER
pt had reported AH /VH beginning 2 weeks prior to admission but not x past 24 hours since admission 5/27/2020

## 2020-05-29 NOTE — PROGRESS NOTE BEHAVIORAL HEALTH - NSBHCHARTREVIEWVS_PSY_A_CORE FT
Vital Signs Last 24 Hrs  T(C): 36.4 (29 May 2020 08:59), Max: 36.4 (29 May 2020 08:59)  T(F): 97.5 (29 May 2020 08:59), Max: 97.5 (29 May 2020 08:59)  HR: --  BP: --  BP(mean): --  RR: 16 (29 May 2020 08:59) (16 - 16)  SpO2: 100% (29 May 2020 08:59) (100% - 100%)

## 2020-05-29 NOTE — PROGRESS NOTE BEHAVIORAL HEALTH - NSBHCHARTREVIEWIMAGING_PSY_A_CORE FT
MEDICATIONS  (STANDING):  ascorbic acid 500 milliGRAM(s) Oral two times a day  escitalopram 10 milliGRAM(s) Oral daily  risperiDONE   Tablet 2 milliGRAM(s) Oral at bedtime  traZODone 150 milliGRAM(s) Oral at bedtime    MEDICATIONS  (PRN):  acetaminophen   Tablet .. 650 milliGRAM(s) Oral every 6 hours PRN Temp greater or equal to 38C (100.4F), Mild Pain (1 - 3), Moderate Pain (4 - 6)  ALBUTerol    90 MICROgram(s) HFA Inhaler 2 Puff(s) Inhalation every 6 hours PRN Shortness of Breath and/or Wheezing  aluminum hydroxide/magnesium hydroxide/simethicone Suspension 30 milliLiter(s) Oral every 6 hours PRN Dyspepsia  benzonatate 100 milliGRAM(s) Oral three times a day PRN Cough  diphenhydrAMINE 50 milliGRAM(s) Oral every 6 hours PRN Extrapyramidal prophylaxis/anxiety/agitation due to psychosis  diphenhydrAMINE   Injectable 50 milliGRAM(s) IntraMuscular every 6 hours PRN Extrapyramidal prophylaxis/psychosis related severe agitation/ aggression  haloperidol     Tablet 2 milliGRAM(s) Oral every 6 hours PRN agitation  haloperidol    Injectable 5 milliGRAM(s) IntraMuscular every 6 hours PRN severe psychosis related aggression  LORazepam     Tablet 1 milliGRAM(s) Oral every 6 hours PRN moderate psychois related aggression.anxiety  LORazepam   Injectable 2 milliGRAM(s) IntraMuscular every 6 hours PRN severe psychosis related agitation/aggression  magnesium hydroxide Suspension 30 milliLiter(s) Oral daily PRN Constipation

## 2020-05-29 NOTE — PROGRESS NOTE BEHAVIORAL HEALTH - NSBHFUPINTERVALHXFT_PSY_A_CORE
Pt seen by writer via ZOOM remote laptop  with 5N staff present with the pt on the unit. Pt in hospital gown   and appeared disheveled, unkempt and somewhat baffled and confused . Pt appeared tired, markedly fatigued compared to the day before. Pt with fair eye contact. Pt with mood appearing depressed and anxious, perplexed and  with speech  paucity in marked contrast to pt's initial presentation only 24 hours earlier.  Pt reported eating alright but appeared to be wandering in the corridor alone and no longer the extrovert he had presented himself to be  only hours earlier. Pt currently denied  SI /HI /AH /VH but he appeared more distracted and internally preoccupied as if possibly in response to AH.   The pt is tolerating his newly restarted Risperdal, Lexapro and Trazodone  without side effects  but with efficacy still too early to fully gauge.  Pt was amenable to writer's recommendation to increase the pt's Risperdal dose  to further alleviate the pt's significant bipolar affective d/o with ongoing thought disorganization. Judgment remains  markedly impaired with virtually no insight into his illness and the need for ongoing consistent treatment.

## 2020-05-30 DIAGNOSIS — F31.64 BIPOLAR DISORDER, CURRENT EPISODE MIXED, SEVERE, WITH PSYCHOTIC FEATURES: ICD-10-CM

## 2020-05-30 DIAGNOSIS — F31.2 BIPOLAR DISORDER, CURRENT EPISODE MANIC SEVERE WITH PSYCHOTIC FEATURES: ICD-10-CM

## 2020-05-30 PROCEDURE — 99232 SBSQ HOSP IP/OBS MODERATE 35: CPT

## 2020-05-30 RX ORDER — DIVALPROEX SODIUM 500 MG/1
500 TABLET, DELAYED RELEASE ORAL AT BEDTIME
Refills: 0 | Status: DISCONTINUED | OUTPATIENT
Start: 2020-05-30 | End: 2020-06-01

## 2020-05-30 RX ORDER — ESCITALOPRAM OXALATE 10 MG/1
5 TABLET, FILM COATED ORAL DAILY
Refills: 0 | Status: DISCONTINUED | OUTPATIENT
Start: 2020-05-31 | End: 2020-06-02

## 2020-05-30 RX ORDER — TRAZODONE HCL 50 MG
100 TABLET ORAL AT BEDTIME
Refills: 0 | Status: DISCONTINUED | OUTPATIENT
Start: 2020-05-30 | End: 2020-06-01

## 2020-05-30 RX ADMIN — ESCITALOPRAM OXALATE 10 MILLIGRAM(S): 10 TABLET, FILM COATED ORAL at 08:48

## 2020-05-30 RX ADMIN — Medication 500 MILLIGRAM(S): at 20:50

## 2020-05-30 RX ADMIN — Medication 500 MILLIGRAM(S): at 08:48

## 2020-05-30 RX ADMIN — Medication 50 MILLIGRAM(S): at 19:45

## 2020-05-30 RX ADMIN — RISPERIDONE 2 MILLIGRAM(S): 4 TABLET ORAL at 20:50

## 2020-05-30 RX ADMIN — Medication 1 MILLIGRAM(S): at 22:30

## 2020-05-30 RX ADMIN — Medication 100 MILLIGRAM(S): at 20:50

## 2020-05-30 RX ADMIN — HALOPERIDOL DECANOATE 2 MILLIGRAM(S): 100 INJECTION INTRAMUSCULAR at 20:49

## 2020-05-30 RX ADMIN — DIVALPROEX SODIUM 500 MILLIGRAM(S): 500 TABLET, DELAYED RELEASE ORAL at 20:50

## 2020-05-30 NOTE — PROGRESS NOTE BEHAVIORAL HEALTH - NSBHCHARTREVIEWIMAGING_PSY_A_CORE FT
MEDICATIONS  (STANDING):  ascorbic acid 500 milliGRAM(s) Oral two times a day  diVALproex  milliGRAM(s) Oral at bedtime  risperiDONE   Tablet 2 milliGRAM(s) Oral at bedtime  traZODone 100 milliGRAM(s) Oral at bedtime    MEDICATIONS  (PRN):  acetaminophen   Tablet .. 650 milliGRAM(s) Oral every 6 hours PRN Temp greater or equal to 38C (100.4F), Mild Pain (1 - 3), Moderate Pain (4 - 6)  ALBUTerol    90 MICROgram(s) HFA Inhaler 2 Puff(s) Inhalation every 6 hours PRN Shortness of Breath and/or Wheezing  aluminum hydroxide/magnesium hydroxide/simethicone Suspension 30 milliLiter(s) Oral every 6 hours PRN Dyspepsia  benzonatate 100 milliGRAM(s) Oral three times a day PRN Cough  diphenhydrAMINE 50 milliGRAM(s) Oral every 6 hours PRN Extrapyramidal prophylaxis/anxiety/agitation due to psychosis  diphenhydrAMINE   Injectable 50 milliGRAM(s) IntraMuscular every 6 hours PRN Extrapyramidal prophylaxis/psychosis related severe agitation/ aggression  haloperidol     Tablet 2 milliGRAM(s) Oral every 6 hours PRN agitation  haloperidol    Injectable 5 milliGRAM(s) IntraMuscular every 6 hours PRN severe psychosis related aggression  LORazepam     Tablet 1 milliGRAM(s) Oral every 6 hours PRN moderate psychois related aggression.anxiety  LORazepam   Injectable 2 milliGRAM(s) IntraMuscular every 6 hours PRN severe psychosis related agitation/aggression  magnesium hydroxide Suspension 30 milliLiter(s) Oral daily PRN Constipation

## 2020-05-30 NOTE — PROGRESS NOTE BEHAVIORAL HEALTH - PROBLEM SELECTOR PLAN 1
1.Ongoing staff support and encouragement  2.Ongoing pt psychoeducation related to the importance of ongoing consistent treatment to decrease risk of clinical relapse.    6.SAFE ACT report to be filed due to pt reported HI as above

## 2020-05-30 NOTE — PROGRESS NOTE BEHAVIORAL HEALTH - PROBLEM SELECTOR PLAN 3
1. Lower  Lexapro to 5  mg po q am  2.To increase  Risperdal dose to 2 mg po qhs  3.Trazodone lowered to 100 mg po qhs (sleep)  4.To begin Depakote  mg po qhs 9 bipolar d/o)  5.Disposition planning  6.SAFE ACT report to be filed due to pt reported HI as above

## 2020-05-30 NOTE — PROGRESS NOTE BEHAVIORAL HEALTH - NSBHFUPINTERVALHXFT_PSY_A_CORE
Pt seen by writer via ZOOM remote laptop  with 5N staff present with the pt on the unit. Pt in hospital gown   and appeared disheveled, unkempt and somewhat baffled and confused .   Pt slept very poorly last evening and was seen  but noted to be sleeping for much of the afternoon as a result. Plan discussed with pt and later with RN to continue pt's Risperdal with plan to add Depakote in an effort to alleviate the pt's affective volatility more consistent with a bipolar diathesis. than a primary  major depression with psychosis. .   The pt remained  amenable to writer's recommendation to increase the pt's Risperdal dose  to further alleviate the pt's significant bipolar affective d/o with ongoing thought disorganization. Judgment remains  markedly impaired with virtually no insight into his illness and the need for ongoing consistent treatment. Plan to lower newly restarted Trazodone and Lexapro due to concern that these medications may presently be more activating  and less clinically useful.

## 2020-05-30 NOTE — PROGRESS NOTE BEHAVIORAL HEALTH - NSBHFUPINTERVALCCFT_PSY_A_CORE
Pt slept very poorly last evening and was seen  but noted to be sleeping for much of the afternoon as a result.    Note - On 5/28/2020, this writer filed a pt SAFE ACT report due to pt's HI  and SI  as above.

## 2020-05-31 PROCEDURE — 99232 SBSQ HOSP IP/OBS MODERATE 35: CPT

## 2020-05-31 RX ADMIN — Medication 50 MILLIGRAM(S): at 21:26

## 2020-05-31 RX ADMIN — Medication 500 MILLIGRAM(S): at 09:48

## 2020-05-31 RX ADMIN — DIVALPROEX SODIUM 500 MILLIGRAM(S): 500 TABLET, DELAYED RELEASE ORAL at 21:25

## 2020-05-31 RX ADMIN — ESCITALOPRAM OXALATE 5 MILLIGRAM(S): 10 TABLET, FILM COATED ORAL at 09:48

## 2020-05-31 RX ADMIN — Medication 100 MILLIGRAM(S): at 21:26

## 2020-05-31 RX ADMIN — Medication 500 MILLIGRAM(S): at 21:25

## 2020-05-31 RX ADMIN — Medication 1 MILLIGRAM(S): at 21:26

## 2020-05-31 RX ADMIN — HALOPERIDOL DECANOATE 2 MILLIGRAM(S): 100 INJECTION INTRAMUSCULAR at 21:26

## 2020-05-31 RX ADMIN — RISPERIDONE 2 MILLIGRAM(S): 4 TABLET ORAL at 21:25

## 2020-05-31 NOTE — PROGRESS NOTE BEHAVIORAL HEALTH - OTHER
improving becoming better modulated somewhat rapid but not pressured " I feel good." still somewhat labile becoming more logical ongoing though somewhat less intense grandiose and paranoid delusional thinking pt currently denying AH /VH

## 2020-05-31 NOTE — PROGRESS NOTE BEHAVIORAL HEALTH - NSBHCHARTREVIEWVS_PSY_A_CORE FT
Vital Signs Last 24 Hrs  T(C): 36.4 (31 May 2020 07:23), Max: 36.4 (31 May 2020 07:23)  T(F): 97.5 (31 May 2020 07:23), Max: 97.5 (31 May 2020 07:23)  HR: --  BP: --  BP(mean): --  RR: 18 (31 May 2020 07:23) (18 - 18)  SpO2: 100% (31 May 2020 07:23) (100% - 100%)

## 2020-05-31 NOTE — PROGRESS NOTE BEHAVIORAL HEALTH - PROBLEM SELECTOR PLAN 1
1.Ongoing staff support and encouragement  2.Ongoing pt psychoeducation related to the importance of ongoing consistent treatment to decrease risk of clinical relapse.

## 2020-05-31 NOTE — PROGRESS NOTE BEHAVIORAL HEALTH - NSBHFUPINTERVALCCFT_PSY_A_CORE
" I slept better last night. I came here because I didn't feel good. I had no energy and I had diarrhea and a cough."

## 2020-05-31 NOTE — PROGRESS NOTE BEHAVIORAL HEALTH - NSBHCHARTREVIEWIMAGING_PSY_A_CORE FT
MEDICATIONS  (STANDING):  ascorbic acid 500 milliGRAM(s) Oral two times a day  diVALproex  milliGRAM(s) Oral at bedtime  escitalopram 5 milliGRAM(s) Oral daily  risperiDONE   Tablet 2 milliGRAM(s) Oral at bedtime  traZODone 100 milliGRAM(s) Oral at bedtime    MEDICATIONS  (PRN):  acetaminophen   Tablet .. 650 milliGRAM(s) Oral every 6 hours PRN Temp greater or equal to 38C (100.4F), Mild Pain (1 - 3), Moderate Pain (4 - 6)  ALBUTerol    90 MICROgram(s) HFA Inhaler 2 Puff(s) Inhalation every 6 hours PRN Shortness of Breath and/or Wheezing  aluminum hydroxide/magnesium hydroxide/simethicone Suspension 30 milliLiter(s) Oral every 6 hours PRN Dyspepsia  benzonatate 100 milliGRAM(s) Oral three times a day PRN Cough  diphenhydrAMINE 50 milliGRAM(s) Oral every 6 hours PRN Extrapyramidal prophylaxis/anxiety/agitation due to psychosis  diphenhydrAMINE   Injectable 50 milliGRAM(s) IntraMuscular every 6 hours PRN Extrapyramidal prophylaxis/psychosis related severe agitation/ aggression  haloperidol     Tablet 2 milliGRAM(s) Oral every 6 hours PRN agitation  haloperidol    Injectable 5 milliGRAM(s) IntraMuscular every 6 hours PRN severe psychosis related aggression  LORazepam     Tablet 1 milliGRAM(s) Oral every 6 hours PRN moderate psychois related aggression.anxiety  LORazepam   Injectable 2 milliGRAM(s) IntraMuscular every 6 hours PRN severe psychosis related agitation/aggression  magnesium hydroxide Suspension 30 milliLiter(s) Oral daily PRN Constipation

## 2020-05-31 NOTE — PROGRESS NOTE BEHAVIORAL HEALTH - PROBLEM SELECTOR PLAN 3
1. Lower  Lexapro to 5  mg po q am  2.To increase  Risperdal dose to 2 mg po qhs  3.Trazodone lowered to 100 mg po qhs (sleep)  4.To continue Depakote  mg po qhs ( bipolar d/o)  5.Disposition planning  6.SAFE ACT report to be filed due to pt reported HI as above

## 2020-06-01 DIAGNOSIS — Z71.89 OTHER SPECIFIED COUNSELING: ICD-10-CM

## 2020-06-01 PROCEDURE — 99232 SBSQ HOSP IP/OBS MODERATE 35: CPT

## 2020-06-01 RX ORDER — DIVALPROEX SODIUM 500 MG/1
750 TABLET, DELAYED RELEASE ORAL AT BEDTIME
Refills: 0 | Status: DISCONTINUED | OUTPATIENT
Start: 2020-06-01 | End: 2020-06-02

## 2020-06-01 RX ORDER — TRAZODONE HCL 50 MG
150 TABLET ORAL AT BEDTIME
Refills: 0 | Status: DISCONTINUED | OUTPATIENT
Start: 2020-06-01 | End: 2020-06-02

## 2020-06-01 RX ADMIN — RISPERIDONE 2 MILLIGRAM(S): 4 TABLET ORAL at 20:41

## 2020-06-01 RX ADMIN — Medication 500 MILLIGRAM(S): at 09:41

## 2020-06-01 RX ADMIN — Medication 50 MILLIGRAM(S): at 22:27

## 2020-06-01 RX ADMIN — ESCITALOPRAM OXALATE 5 MILLIGRAM(S): 10 TABLET, FILM COATED ORAL at 09:40

## 2020-06-01 RX ADMIN — Medication 500 MILLIGRAM(S): at 20:42

## 2020-06-01 RX ADMIN — DIVALPROEX SODIUM 750 MILLIGRAM(S): 500 TABLET, DELAYED RELEASE ORAL at 20:41

## 2020-06-01 RX ADMIN — Medication 1 MILLIGRAM(S): at 23:39

## 2020-06-01 RX ADMIN — Medication 150 MILLIGRAM(S): at 20:41

## 2020-06-01 NOTE — PROGRESS NOTE BEHAVIORAL HEALTH - NSBHCHARTREVIEWIMAGING_PSY_A_CORE FT
MEDICATIONS  (STANDING):  ascorbic acid 500 milliGRAM(s) Oral two times a day  diVALproex  milliGRAM(s) Oral at bedtime  escitalopram 5 milliGRAM(s) Oral daily  risperiDONE   Tablet 2 milliGRAM(s) Oral at bedtime  traZODone 150 milliGRAM(s) Oral at bedtime    MEDICATIONS  (PRN):  acetaminophen   Tablet .. 650 milliGRAM(s) Oral every 6 hours PRN Temp greater or equal to 38C (100.4F), Mild Pain (1 - 3), Moderate Pain (4 - 6)  ALBUTerol    90 MICROgram(s) HFA Inhaler 2 Puff(s) Inhalation every 6 hours PRN Shortness of Breath and/or Wheezing  aluminum hydroxide/magnesium hydroxide/simethicone Suspension 30 milliLiter(s) Oral every 6 hours PRN Dyspepsia  benzonatate 100 milliGRAM(s) Oral three times a day PRN Cough  diphenhydrAMINE 50 milliGRAM(s) Oral every 6 hours PRN Extrapyramidal prophylaxis/anxiety/agitation due to psychosis  diphenhydrAMINE   Injectable 50 milliGRAM(s) IntraMuscular every 6 hours PRN Extrapyramidal prophylaxis/psychosis related severe agitation/ aggression  haloperidol     Tablet 2 milliGRAM(s) Oral every 6 hours PRN agitation  haloperidol    Injectable 5 milliGRAM(s) IntraMuscular every 6 hours PRN severe psychosis related aggression  LORazepam     Tablet 1 milliGRAM(s) Oral every 6 hours PRN moderate psychois related aggression.anxiety  LORazepam   Injectable 2 milliGRAM(s) IntraMuscular every 6 hours PRN severe psychosis related agitation/aggression  magnesium hydroxide Suspension 30 milliLiter(s) Oral daily PRN Constipation

## 2020-06-01 NOTE — PROGRESS NOTE BEHAVIORAL HEALTH - NSBHCHARTREVIEWVS_PSY_A_CORE FT
Vital Signs Last 24 Hrs  T(C): 36.4 (01 Jun 2020 07:28), Max: 36.4 (01 Jun 2020 07:28)  T(F): 97.5 (01 Jun 2020 07:28), Max: 97.5 (01 Jun 2020 07:28)  HR: --  BP: --  BP(mean): --  RR: 14 (01 Jun 2020 07:28) (14 - 14)  SpO2: 97% (01 Jun 2020 07:28) (97% - 97%)

## 2020-06-01 NOTE — PROGRESS NOTE BEHAVIORAL HEALTH - OTHER
becoming better modulated somewhat rapid but not pressured " I feel good.  Just tired. Ready to go to rehab." still somewhat labile becoming more logical ongoing though somewhat less intense grandiose and paranoid delusional thinking pt currently denying AH /VH

## 2020-06-01 NOTE — PROGRESS NOTE BEHAVIORAL HEALTH - NSBHFUPINTERVALHXFT_PSY_A_CORE
Pt seen by writer via ZOOM remote laptop  with 5N staff present with the pt on the unit. Pt tolerating newly added  Depakote  mg po qhs  with plan to increase dose to further alleviate the pt's presumptive bipolar disorder rather  than a primary  major depression with psychosis.  Plan discussed to resume previously effective Trazodone with dose returned to 150 mg po qhs for insomnia.   The pt remained  amenable to writer's recommendation to continue the pt's Risperdal dose  to further alleviate the pt's significant bipolar affective d/o with ongoing  though now slowly resolving thought disorganization. Judgment remains  quite impaired with virtually no insight into his illness and the need for ongoing consistent treatment in an effort to decrease the risk of clinical relapse .    The pt appears tired and inattentive, distracted and affectively somewhat labile. Pt asking again as to when he could leave for an inpatient substance use d/o dual diagnosis program . Writer reminded the pt that first the pt would need to be clinically stable psychiatrically and also COVID negative twice as per hospital protocol before the pt. could be accepted  into a substance treatment program for dual diagnosis treatment. Though the pt currently denies active SI /HI /AH /VH, he remains rather affectively volatile and impulsive with low frustration tolerance.

## 2020-06-01 NOTE — PROGRESS NOTE BEHAVIORAL HEALTH - PROBLEM SELECTOR PLAN 3
1. Lexapro to 5  mg po q am  2.Continue Risperdal  2 mg po qhs  3.Trazodone returned to 150 mg po qhs (insomnia)  4.To increase  Depakote ER to 750 mg po qhs ( bipolar d/o)  5.Disposition planning  6.SAFE ACT report to be filed due to pt reported HI as above

## 2020-06-02 PROCEDURE — 99232 SBSQ HOSP IP/OBS MODERATE 35: CPT

## 2020-06-02 RX ORDER — LANOLIN ALCOHOL/MO/W.PET/CERES
5 CREAM (GRAM) TOPICAL AT BEDTIME
Refills: 0 | Status: DISCONTINUED | OUTPATIENT
Start: 2020-06-02 | End: 2020-06-11

## 2020-06-02 RX ORDER — DIVALPROEX SODIUM 500 MG/1
1000 TABLET, DELAYED RELEASE ORAL AT BEDTIME
Refills: 0 | Status: DISCONTINUED | OUTPATIENT
Start: 2020-06-02 | End: 2020-06-04

## 2020-06-02 RX ORDER — RISPERIDONE 4 MG/1
3 TABLET ORAL AT BEDTIME
Refills: 0 | Status: DISCONTINUED | OUTPATIENT
Start: 2020-06-02 | End: 2020-06-05

## 2020-06-02 RX ADMIN — Medication 500 MILLIGRAM(S): at 21:26

## 2020-06-02 RX ADMIN — DIVALPROEX SODIUM 1000 MILLIGRAM(S): 500 TABLET, DELAYED RELEASE ORAL at 21:26

## 2020-06-02 RX ADMIN — Medication 50 MILLIGRAM(S): at 23:35

## 2020-06-02 RX ADMIN — Medication 50 MILLIGRAM(S): at 21:26

## 2020-06-02 RX ADMIN — HALOPERIDOL DECANOATE 5 MILLIGRAM(S): 100 INJECTION INTRAMUSCULAR at 23:35

## 2020-06-02 RX ADMIN — Medication 5 MILLIGRAM(S): at 21:27

## 2020-06-02 RX ADMIN — Medication 1 MILLIGRAM(S): at 21:26

## 2020-06-02 RX ADMIN — Medication 50 MILLIGRAM(S): at 00:25

## 2020-06-02 RX ADMIN — Medication 500 MILLIGRAM(S): at 09:04

## 2020-06-02 RX ADMIN — RISPERIDONE 3 MILLIGRAM(S): 4 TABLET ORAL at 21:26

## 2020-06-02 RX ADMIN — ESCITALOPRAM OXALATE 5 MILLIGRAM(S): 10 TABLET, FILM COATED ORAL at 09:04

## 2020-06-02 NOTE — PROGRESS NOTE BEHAVIORAL HEALTH - NSBHCHARTREVIEWVS_PSY_A_CORE FT
Vital Signs Last 24 Hrs  T(C): 36.4 (02 Jun 2020 08:25), Max: 36.4 (02 Jun 2020 08:25)  T(F): 97.6 (02 Jun 2020 08:25), Max: 97.6 (02 Jun 2020 08:25)  HR: --  BP: --  BP(mean): --  RR: 14 (02 Jun 2020 08:25) (14 - 14)  SpO2: 100% (02 Jun 2020 08:25) (100% - 100%)

## 2020-06-02 NOTE — PROGRESS NOTE BEHAVIORAL HEALTH - PROBLEM SELECTOR PLAN 3
1.DC  Lexapro ( possible paradoxical agitation)  2.Increase  Risperdal  to 3  mg po qhs  3.DC Trazodone ( possible paradoxical agitation)  4.To increase  Depakote ER to 1000 mg po qhs ( bipolar d/o)  5.Melatonin 5 mg po qhs ( insomnia)  6.Disposition planning  7..SAFE ACT report to be filed due to pt reported HI as above

## 2020-06-02 NOTE — PROGRESS NOTE BEHAVIORAL HEALTH - NSBHFUPINTERVALHXFT_PSY_A_CORE
Pt seen by writer via ZOOM remote laptop  with 5N staff present with the pt on the unit. Pt appeared exhausted and irritable. In hospital gown , somewhat disheveled and unshaven. Agitated and appearing distracted and preoccupied.  Plan discussed to DC Trazodone and Lexapro  due to possible paradoxical agitation in a pt. with a presumptive bipolar d/o.    The pt remained  amenable to writer's recommendation to titrate  the pt's Risperdal  and Depakote doses   to further alleviate the pt's significant bipolar affective d/o with ongoing  affective lability and  thought disorganization. Judgment remains  quite impaired with virtually no insight into his illness and the need for ongoing consistent treatment in an effort to decrease the risk of clinical relapse .    The pt appears tired and inattentive, distracted and affectively somewhat labile. Pt asking again as to when he could leave for an inpatient substance use d/o dual diagnosis program . Of note, hospital therapist conversation with the pt. revealed pt report that he had more recently begun " mixing Suboxone with heroin"  after having been sober x years.  Writer reminded the pt that first the pt would need to be clinically stable psychiatrically and also COVID negative twice as per hospital protocol before

## 2020-06-02 NOTE — PROGRESS NOTE BEHAVIORAL HEALTH - OTHER
mumbled ' I couldn't sleep" ongoing  grandiose, somatic, persecutory  and paranoid delusional thinking pt currently denying AH /VH

## 2020-06-02 NOTE — PROGRESS NOTE BEHAVIORAL HEALTH - NSBHFUPINTERVALCCFT_PSY_A_CORE
" I didn't sleep at all last night.  I want to go back to rehab so I don't keep using or go back to heroin.. I thought I came here because I was  COVID positive. No other reason."

## 2020-06-03 LAB — SARS-COV-2 RNA SPEC QL NAA+PROBE: DETECTED

## 2020-06-03 PROCEDURE — 99232 SBSQ HOSP IP/OBS MODERATE 35: CPT

## 2020-06-03 RX ADMIN — DIVALPROEX SODIUM 1000 MILLIGRAM(S): 500 TABLET, DELAYED RELEASE ORAL at 20:46

## 2020-06-03 RX ADMIN — Medication 500 MILLIGRAM(S): at 20:46

## 2020-06-03 RX ADMIN — Medication 1 MILLIGRAM(S): at 20:46

## 2020-06-03 RX ADMIN — Medication 5 MILLIGRAM(S): at 20:47

## 2020-06-03 RX ADMIN — Medication 50 MILLIGRAM(S): at 23:16

## 2020-06-03 RX ADMIN — HALOPERIDOL DECANOATE 2 MILLIGRAM(S): 100 INJECTION INTRAMUSCULAR at 23:16

## 2020-06-03 RX ADMIN — RISPERIDONE 3 MILLIGRAM(S): 4 TABLET ORAL at 20:47

## 2020-06-03 RX ADMIN — Medication 50 MILLIGRAM(S): at 20:48

## 2020-06-03 NOTE — PROGRESS NOTE BEHAVIORAL HEALTH - PROBLEM SELECTOR PLAN 3
1.DC  Lexapro ( possible paradoxical agitation)  2.Increase  Risperdal  to 3  mg po qhs  3.DC Trazodone ( possible paradoxical agitation)  4.To increase  Depakote ER to 1000 mg po qhs ( bipolar d/o)  5.Melatonin 5 mg po qhs ( insomnia)  6.Disposition planning  7..SAFE ACT report to be filed due to pt reported HI as above 1.DC  Lexapro ( possible paradoxical agitation)  2.Increase  Risperdal  to 3  mg po qhs  3.DC Trazodone ( possible paradoxical agitation)  4.To recheck Depakote level on 6/4/2020 with increased  Depakote ER dose of  1000 mg po qhs ( bipolar d/o)  5.Melatonin 5 mg po qhs ( insomnia)  6.Disposition planning  7..SAFE ACT report to be filed due to pt reported HI as above

## 2020-06-03 NOTE — PROGRESS NOTE BEHAVIORAL HEALTH - NSBHCHARTREVIEWVS_PSY_A_CORE FT
Vital Signs Last 24 Hrs  T(C): 36.4 (02 Jun 2020 08:25), Max: 36.4 (02 Jun 2020 08:25)  T(F): 97.6 (02 Jun 2020 08:25), Max: 97.6 (02 Jun 2020 08:25)  HR: --  BP: --  BP(mean): --  RR: 14 (02 Jun 2020 08:25) (14 - 14)  SpO2: 100% (02 Jun 2020 08:25) (100% - 100%) Vital Signs Last 24 Hrs  T(C): --  T(F): --  HR: --  BP: --  BP(mean): --  RR: 18 (03 Jun 2020 07:35) (18 - 18)  SpO2: 99% (03 Jun 2020 07:35) (99% - 99%)

## 2020-06-03 NOTE — PROGRESS NOTE BEHAVIORAL HEALTH - NSBHCHARTREVIEWIMAGING_PSY_A_CORE FT
MEDICATIONS  (STANDING):  ascorbic acid 500 milliGRAM(s) Oral two times a day  diVALproex ER 1000 milliGRAM(s) Oral at bedtime  melatonin 5 milliGRAM(s) Oral at bedtime  risperiDONE   Tablet 3 milliGRAM(s) Oral at bedtime    MEDICATIONS  (PRN):  acetaminophen   Tablet .. 650 milliGRAM(s) Oral every 6 hours PRN Temp greater or equal to 38C (100.4F), Mild Pain (1 - 3), Moderate Pain (4 - 6)  ALBUTerol    90 MICROgram(s) HFA Inhaler 2 Puff(s) Inhalation every 6 hours PRN Shortness of Breath and/or Wheezing  aluminum hydroxide/magnesium hydroxide/simethicone Suspension 30 milliLiter(s) Oral every 6 hours PRN Dyspepsia  benzonatate 100 milliGRAM(s) Oral three times a day PRN Cough  diphenhydrAMINE 50 milliGRAM(s) Oral every 6 hours PRN Extrapyramidal prophylaxis/anxiety/agitation due to psychosis  diphenhydrAMINE   Injectable 50 milliGRAM(s) IntraMuscular every 6 hours PRN Extrapyramidal prophylaxis/psychosis related severe agitation/ aggression  haloperidol     Tablet 2 milliGRAM(s) Oral every 6 hours PRN agitation  haloperidol    Injectable 5 milliGRAM(s) IntraMuscular every 6 hours PRN severe psychosis related aggression  LORazepam     Tablet 1 milliGRAM(s) Oral every 6 hours PRN moderate psychois related aggression.anxiety  LORazepam   Injectable 2 milliGRAM(s) IntraMuscular every 6 hours PRN severe psychosis related agitation/aggression  magnesium hydroxide Suspension 30 milliLiter(s) Oral daily PRN Constipation

## 2020-06-03 NOTE — PROGRESS NOTE BEHAVIORAL HEALTH - PROBLEM SELECTOR PLAN 2
1.Monitor COVID labs   2.Supportive care  3.Monitor vital signs including Temp, HR, O2 Sats 1.Monitor COVID labs   2.Supportive care  3.Monitor vital signs including Temp, HR, O2 Sats  4.To recheck COVID -19 PCR 6/3/2020. Result pending

## 2020-06-03 NOTE — PROGRESS NOTE BEHAVIORAL HEALTH - NSBHFUPINTERVALHXFT_PSY_A_CORE
Pt seen by writer via ZOOM remote laptop  with 5N staff present with the pt on the unit. Pt either fast asleep in his room or briefly up and agitated on the unit, demanding to know when lunch would  somewhat disheveled and unshaven. Agitated and appearing distracted and preoccupied.  Plan discussed to DC Trazodone and Lexapro  due to possible paradoxical agitation in a pt. with a presumptive bipolar d/o.    The pt remained  amenable to writer's recommendation to titrate  the pt's Risperdal  and Depakote doses   to further alleviate the pt's significant bipolar affective d/o with ongoing  affective lability and  thought disorganization. Judgment remains  quite impaired with virtually no insight into his illness and the need for ongoing consistent treatment in an effort to decrease the risk of clinical relapse .    The pt appears tired and inattentive, distracted and affectively somewhat labile. Pt asking again as to when he could leave for an inpatient substance use d/o dual diagnosis program . Of note, hospital therapist conversation with the pt. revealed pt report that he had more recently begun " mixing Suboxone with heroin"  after having been sober x years.  Writer reminded the pt that first the pt would need to be clinically stable psychiatrically and also COVID negative twice as per hospital protocol before Pt seen by writer via ZOOM remote laptop  with 5N staff present with the pt on the unit. Pt either fast asleep in his room or briefly up and agitated on the unit, alternately demanding  and apologetic. Affective volatility apparent with frequent staff reports of the pt's sudden , unpredictable explosive outbursts with verbally abusive statements in the context of generally minor issues, ( eg when lunch was going to be served)Agitated and appearing distracted and preoccupied.  Plan discussed to DC Trazodone and Lexapro  due to possible paradoxical agitation in a pt. with a presumptive bipolar d/o.    The pt remained  amenable to writer's recommendation to continue to titrate  the pt's Risperdal  and Depakote doses   to further alleviate the pt's significant bipolar affective d/o with ongoing  affective lability and  thought disorganization. Judgment remains  quite impaired with virtually no insight into his illness and the need for ongoing consistent treatment in an effort to decrease the risk of clinical relapse .    The pt continues with almost no sleep despite ongoing titration of his Depakote and Risperdal along with the addition  of Melatonin.            From a medical and psychiatric perspective, In the pt's current clinical state, it would be inadvisable for him to be safely discharged from the hospital due to his current severe bipolar mixed d/o with thought disorganization, his reported recent SI  and  HI towards his cousin ( SAFE ACT filed upon the pt's admission to hospital)  and due to the pt's recent COVID + status on 5/27/2020 and his needing to be twice COVID negative before he could travel to [place] to visit with his father.

## 2020-06-03 NOTE — PROGRESS NOTE BEHAVIORAL HEALTH - OTHER
mumbled ' I couldn't sleep" ongoing  grandiose, somatic, persecutory  and paranoid delusional thinking pt currently denying AH /VH variable due to severity of his affective volatility unpredictably volatile and enraged with little provocation variable ' I couldn't sleep. I need to sleep>" ongoing  grandiose, somatic, persecutory  and paranoid delusional thinking, recent SI /HI though pt currently denies active plans

## 2020-06-03 NOTE — PROGRESS NOTE BEHAVIORAL HEALTH - NSBHFUPVIOLFT_PSY_A_CORE
towards pt's cousin as above without current plan or intent towards pt's cousin as above without current plan or intent   verbally abusive at times

## 2020-06-03 NOTE — PROGRESS NOTE BEHAVIORAL HEALTH - NSBHFUPINTERVALCCFT_PSY_A_CORE
Pt learned from his brother that their father , with a h/o COPD is in failing health in [place]. Pt learned from his brother that their father , with a h/o COPD is in [facility] health in [place].     Repeat COVID -19 PCR ordered 6/3/2020 at pt's request as above  . Result pending  Initial COVID + 5/27/2020)

## 2020-06-04 LAB
ALBUMIN SERPL ELPH-MCNC: 3 G/DL — LOW (ref 3.3–5)
ALP SERPL-CCNC: 74 U/L — SIGNIFICANT CHANGE UP (ref 40–120)
ALT FLD-CCNC: 61 U/L — SIGNIFICANT CHANGE UP (ref 12–78)
ANION GAP SERPL CALC-SCNC: 3 MMOL/L — LOW (ref 5–17)
AST SERPL-CCNC: 23 U/L — SIGNIFICANT CHANGE UP (ref 15–37)
BASOPHILS # BLD AUTO: 0.04 K/UL — SIGNIFICANT CHANGE UP (ref 0–0.2)
BASOPHILS NFR BLD AUTO: 0.7 % — SIGNIFICANT CHANGE UP (ref 0–2)
BILIRUB SERPL-MCNC: 0.2 MG/DL — SIGNIFICANT CHANGE UP (ref 0.2–1.2)
BUN SERPL-MCNC: 7 MG/DL — SIGNIFICANT CHANGE UP (ref 7–23)
CALCIUM SERPL-MCNC: 8.6 MG/DL — SIGNIFICANT CHANGE UP (ref 8.5–10.1)
CHLORIDE SERPL-SCNC: 108 MMOL/L — SIGNIFICANT CHANGE UP (ref 96–108)
CO2 SERPL-SCNC: 28 MMOL/L — SIGNIFICANT CHANGE UP (ref 22–31)
CREAT SERPL-MCNC: 0.78 MG/DL — SIGNIFICANT CHANGE UP (ref 0.5–1.3)
EOSINOPHIL # BLD AUTO: 0.1 K/UL — SIGNIFICANT CHANGE UP (ref 0–0.5)
EOSINOPHIL NFR BLD AUTO: 1.8 % — SIGNIFICANT CHANGE UP (ref 0–6)
GLUCOSE SERPL-MCNC: 116 MG/DL — HIGH (ref 70–99)
HCT VFR BLD CALC: 39.9 % — SIGNIFICANT CHANGE UP (ref 39–50)
HGB BLD-MCNC: 13.4 G/DL — SIGNIFICANT CHANGE UP (ref 13–17)
IMM GRANULOCYTES NFR BLD AUTO: 0.4 % — SIGNIFICANT CHANGE UP (ref 0–1.5)
LYMPHOCYTES # BLD AUTO: 1.72 K/UL — SIGNIFICANT CHANGE UP (ref 1–3.3)
LYMPHOCYTES # BLD AUTO: 31.6 % — SIGNIFICANT CHANGE UP (ref 13–44)
MCHC RBC-ENTMCNC: 29.6 PG — SIGNIFICANT CHANGE UP (ref 27–34)
MCHC RBC-ENTMCNC: 33.6 GM/DL — SIGNIFICANT CHANGE UP (ref 32–36)
MCV RBC AUTO: 88.3 FL — SIGNIFICANT CHANGE UP (ref 80–100)
MONOCYTES # BLD AUTO: 0.46 K/UL — SIGNIFICANT CHANGE UP (ref 0–0.9)
MONOCYTES NFR BLD AUTO: 8.4 % — SIGNIFICANT CHANGE UP (ref 2–14)
NEUTROPHILS # BLD AUTO: 3.11 K/UL — SIGNIFICANT CHANGE UP (ref 1.8–7.4)
NEUTROPHILS NFR BLD AUTO: 57.1 % — SIGNIFICANT CHANGE UP (ref 43–77)
PLATELET # BLD AUTO: 161 K/UL — SIGNIFICANT CHANGE UP (ref 150–400)
POTASSIUM SERPL-MCNC: 3.8 MMOL/L — SIGNIFICANT CHANGE UP (ref 3.5–5.3)
POTASSIUM SERPL-SCNC: 3.8 MMOL/L — SIGNIFICANT CHANGE UP (ref 3.5–5.3)
PROT SERPL-MCNC: 6.4 GM/DL — SIGNIFICANT CHANGE UP (ref 6–8.3)
RBC # BLD: 4.52 M/UL — SIGNIFICANT CHANGE UP (ref 4.2–5.8)
RBC # FLD: 12.9 % — SIGNIFICANT CHANGE UP (ref 10.3–14.5)
SODIUM SERPL-SCNC: 139 MMOL/L — SIGNIFICANT CHANGE UP (ref 135–145)
VALPROATE SERPL-MCNC: 67 UG/ML — SIGNIFICANT CHANGE UP (ref 50–100)
WBC # BLD: 5.45 K/UL — SIGNIFICANT CHANGE UP (ref 3.8–10.5)
WBC # FLD AUTO: 5.45 K/UL — SIGNIFICANT CHANGE UP (ref 3.8–10.5)

## 2020-06-04 PROCEDURE — 99232 SBSQ HOSP IP/OBS MODERATE 35: CPT

## 2020-06-04 RX ORDER — DIVALPROEX SODIUM 500 MG/1
1500 TABLET, DELAYED RELEASE ORAL AT BEDTIME
Refills: 0 | Status: DISCONTINUED | OUTPATIENT
Start: 2020-06-04 | End: 2020-06-08

## 2020-06-04 RX ORDER — QUETIAPINE FUMARATE 200 MG/1
100 TABLET, FILM COATED ORAL ONCE
Refills: 0 | Status: COMPLETED | OUTPATIENT
Start: 2020-06-04 | End: 2020-06-04

## 2020-06-04 RX ADMIN — Medication 2 MILLIGRAM(S): at 23:18

## 2020-06-04 RX ADMIN — Medication 5 MILLIGRAM(S): at 20:34

## 2020-06-04 RX ADMIN — DIVALPROEX SODIUM 1500 MILLIGRAM(S): 500 TABLET, DELAYED RELEASE ORAL at 20:32

## 2020-06-04 RX ADMIN — QUETIAPINE FUMARATE 100 MILLIGRAM(S): 200 TABLET, FILM COATED ORAL at 22:24

## 2020-06-04 RX ADMIN — Medication 1 MILLIGRAM(S): at 20:33

## 2020-06-04 RX ADMIN — Medication 500 MILLIGRAM(S): at 20:34

## 2020-06-04 RX ADMIN — RISPERIDONE 3 MILLIGRAM(S): 4 TABLET ORAL at 20:34

## 2020-06-04 RX ADMIN — HALOPERIDOL DECANOATE 5 MILLIGRAM(S): 100 INJECTION INTRAMUSCULAR at 23:18

## 2020-06-04 RX ADMIN — Medication 50 MILLIGRAM(S): at 20:34

## 2020-06-04 NOTE — PROGRESS NOTE BEHAVIORAL HEALTH - NSBHCHARTREVIEWIMAGING_PSY_A_CORE FT
MEDICATIONS  (STANDING):  ascorbic acid 500 milliGRAM(s) Oral two times a day  diVALproex ER 1500 milliGRAM(s) Oral at bedtime  melatonin 5 milliGRAM(s) Oral at bedtime  risperiDONE   Tablet 3 milliGRAM(s) Oral at bedtime    MEDICATIONS  (PRN):  acetaminophen   Tablet .. 650 milliGRAM(s) Oral every 6 hours PRN Temp greater or equal to 38C (100.4F), Mild Pain (1 - 3), Moderate Pain (4 - 6)  ALBUTerol    90 MICROgram(s) HFA Inhaler 2 Puff(s) Inhalation every 6 hours PRN Shortness of Breath and/or Wheezing  aluminum hydroxide/magnesium hydroxide/simethicone Suspension 30 milliLiter(s) Oral every 6 hours PRN Dyspepsia  benzonatate 100 milliGRAM(s) Oral three times a day PRN Cough  diphenhydrAMINE 50 milliGRAM(s) Oral every 6 hours PRN Extrapyramidal prophylaxis/anxiety/agitation due to psychosis  diphenhydrAMINE   Injectable 50 milliGRAM(s) IntraMuscular every 6 hours PRN Extrapyramidal prophylaxis/psychosis related severe agitation/ aggression  haloperidol     Tablet 2 milliGRAM(s) Oral every 6 hours PRN agitation  haloperidol    Injectable 5 milliGRAM(s) IntraMuscular every 6 hours PRN severe psychosis related aggression  LORazepam     Tablet 1 milliGRAM(s) Oral every 6 hours PRN moderate psychois related aggression.anxiety  LORazepam   Injectable 2 milliGRAM(s) IntraMuscular every 6 hours PRN severe psychosis related agitation/aggression  magnesium hydroxide Suspension 30 milliLiter(s) Oral daily PRN Constipation

## 2020-06-04 NOTE — PROGRESS NOTE BEHAVIORAL HEALTH - NSBHFUPINTERVALCCFT_PSY_A_CORE
" I'm fine. I still can't sleep. I have to get out of here. I came here for COVID and now I want to leave!"     Repeat COVID -19 PCR ordered 6/3/2020 at pt's request as above  . POSITIVE COVID  ( 6/3/2020)  Initial COVID + 5/27/2020)

## 2020-06-04 NOTE — PROGRESS NOTE BEHAVIORAL HEALTH - OTHER
variable due to severity of his affective volatility unpredictably volatile and enraged with little provocation variable ' I couldn't sleep. I need to sleep>" ongoing  grandiose, somatic, persecutory  and paranoid delusional thinking, recent SI /HI though pt currently denies active plans pt currently denying AH /VH

## 2020-06-04 NOTE — PROGRESS NOTE BEHAVIORAL HEALTH - NSBHFUPINTERVALHXFT_PSY_A_CORE
Pt seen by writer via ZOOM remote laptop  with 5N staff present with the pt on the unit. Pt either fast asleep in his room or briefly up and agitated on continue to titrate  the pt's Risperdal  and Depakote doses   to further alleviate the pt's significant bipolar affective d/o with ongoing  affective lability . The pt continues with very poor sleep after initial days of decreased need for sleep prior to admission when the pt presented as bipolar manic. The pt has remained unable to sleep and continues to nap during the day time  due to clearly evident exhaustion. The pt remains affectively labile and volatile with no apparent understanding of his psychiatric illness and  with no apparent pt understanding of the potential danger he would face  if he were to leave the hospital  and drive to [place]  to visit his ailing father . The pt continues  with more pronounced thought disorganization and is difficult to follow when attempts are made to discuss  important clinical pt issues.       The pt's appetite is fair. Pt remains largely isolative and withdrawn with minimal interactions with peers and with brief often angry expletive laced comments towards staff  in the context of the pt's low frustration tolerance and poor impulse control.          Additionally, over time, the pt is beginning to slowly reveal his more recent h/o cocaine use , daily by pt report  and his use of Suboxone with other opioids as recently as 2 months prior to his admission  to hospital . on 5/27/2020 with SI /HI . SAFE ACT report filed by writer upon the pt's admission due to acute safety concerns.   The pt is continuing to tolerate his newly begun and slowly titrated Depakote and Risperdal  w/o side effects but with clinical efficacy still too early to gauge.          As noted above,  from a medical and psychiatric perspective, In the pt's current clinical state, it would be inadvisable for him to be safely discharged from the hospital due to his current severe bipolar mixed d/o with thought disorganization, his reported recent SI  and  HI towards his cousin ( SAFE ACT filed upon the pt's admission to hospital)  and due to the pt's recent COVID + status on 5/27/2020 and his needing to be twice COVID negative before he could travel to [place] to visit with his father.

## 2020-06-04 NOTE — PROGRESS NOTE BEHAVIORAL HEALTH - PROBLEM SELECTOR PLAN 3
1.DC  Lexapro ( possible paradoxical agitation)  2.Increased  Risperdal  to 3  mg po qhs  3.DC Trazodone ( possible paradoxical agitation)  4.Rechecked  Depakote level on 6/4/2020 with increased  Depakote ER dose of  1000 mg po qhs ( bipolar d/o). Depakote ER dose increased to 1500 mg po qhs   5.Melatonin 5 mg po qhs ( insomnia)  6.Disposition planning  7..SAFE ACT report to be filed due to pt reported HI as above

## 2020-06-04 NOTE — PROGRESS NOTE BEHAVIORAL HEALTH - PROBLEM SELECTOR PLAN 2
1.Monitor COVID labs   2.Supportive care  3.Monitor vital signs including Temp, HR, O2 Sats  4.Rechecked  COVID -19 PCR 6/3/2020. POSITIVE

## 2020-06-05 PROCEDURE — 99232 SBSQ HOSP IP/OBS MODERATE 35: CPT

## 2020-06-05 RX ORDER — CHLORPROMAZINE HCL 10 MG
200 TABLET ORAL AT BEDTIME
Refills: 0 | Status: DISCONTINUED | OUTPATIENT
Start: 2020-06-05 | End: 2020-06-08

## 2020-06-05 RX ORDER — CHLORPROMAZINE HCL 10 MG
50 TABLET ORAL EVERY 6 HOURS
Refills: 0 | Status: DISCONTINUED | OUTPATIENT
Start: 2020-06-05 | End: 2020-06-11

## 2020-06-05 RX ADMIN — Medication 50 MILLIGRAM(S): at 00:36

## 2020-06-05 RX ADMIN — HALOPERIDOL DECANOATE 2 MILLIGRAM(S): 100 INJECTION INTRAMUSCULAR at 02:49

## 2020-06-05 NOTE — PROGRESS NOTE BEHAVIORAL HEALTH - PROBLEM SELECTOR PLAN 3
1.DC  Lexapro ( possible paradoxical agitation)  2.DC  Risperdal  due to lack of efficacy  3.DC Trazodone ( possible paradoxical agitation)  4. Depakote ER dose increased to 1500 mg po qhs ( bipolar d/o) .To recheck Depakote level, CBC with diff and CMP on 6/8/2020  5.Melatonin 5 mg po qhs ( insomnia)  6.To add standing Thorazine 200 mg po qhs ( bipolar psychosis)   7.Disposition planning  8..SAFE ACT report to be filed due to pt reported HI as above

## 2020-06-05 NOTE — PROGRESS NOTE BEHAVIORAL HEALTH - NSBHCHARTREVIEWVS_PSY_A_CORE FT
Vital Signs Last 24 Hrs  T(C): 36.4 (05 Jun 2020 11:21), Max: 36.4 (05 Jun 2020 11:21)  T(F): 97.6 (05 Jun 2020 11:21), Max: 97.6 (05 Jun 2020 11:21)  HR: --  BP: --  BP(mean): --  RR: 14 (05 Jun 2020 11:21) (14 - 14)  SpO2: 100% (05 Jun 2020 11:21) (100% - 100%)

## 2020-06-05 NOTE — PROGRESS NOTE BEHAVIORAL HEALTH - NSBHFUPINTERVALHXFT_PSY_A_CORE
Pt sleeping/ napping during the day as still unable to sleep for any significant time period despite titration of Depakote and Risperdal.   Case discussed at length with hospital RN staff. as to other treatment options   Pt s/p Code Grey  x 2  the evening of 6/4 and the early morning of 6/5/2020. in the context of the pt's escalating verbal and physical agitation towards staff on the unit.  During this lengthy pt bipolar fueled violent episode  with hospital staff and security in an effort to maintain the security of patients and staff alike, the pt had attempted to punch  the wall in his room and ultimately required a brief period of seclusion after emergency IM medications  were given due to repeated but unsuccessful attempts to help the pt regain safe control of his emotions.   The pt remains affectively labile and volatile with no apparent understanding of his psychiatric illness and  with no apparent pt understanding of the potential danger he would face  if he were to leave the hospital  and drive to [place]  to visit his ailing father .       As above, the pt.  remains largely isolative and withdrawn with minimal interactions with peers and with brief often angry expletive la         Additionally, over time, the pt is beginning to slowly reveal his more recent h/o cocaine use , daily by pt report  and his use of Suboxone with other opioids as recently as 2 months prior to his admission  to hospital . on 5/27/2020 with SI /HI . SAFE ACT report filed by writer upon the pt's admission due to acute safety concerns.    The pt is tolerating his titrated Depakote dose now increased to 1500 mg po qhs in an effort to alleviate the pt's severe bipolar affective disorder with dangerous impulsivity  and more overtly apparent pt potential for violence.  Discussion with hospital staff as to plan to DC largely ineffective Risperdal   with plan to add Thorazine 200 mg po qhs standing to alleviate the pt's ongoing bipolar thought disorganization  with a more sedating first generation antipsychotic medication.          As noted above,  from a medical and psychiatric perspective, In the pt's current clinical state, it would be inadvisable for him to be safely discharged from the hospital due to his current severe bipolar mixed d/o with thought disorganization, his reported recent SI  and  HI towards his cousin ( SAFE ACT filed upon the pt's admission to hospital)  and due to the pt's recent COVID + status on 5/27/2020 and his needing to be twice COVID negative before he could travel to [place] to visit with his father.

## 2020-06-05 NOTE — PROGRESS NOTE BEHAVIORAL HEALTH - NSBHCHARTREVIEWIMAGING_PSY_A_CORE FT
MEDICATIONS  (STANDING):  ascorbic acid 500 milliGRAM(s) Oral two times a day  chlorproMAZINE    Tablet 200 milliGRAM(s) Oral at bedtime  diVALproex ER 1500 milliGRAM(s) Oral at bedtime  melatonin 5 milliGRAM(s) Oral at bedtime    MEDICATIONS  (PRN):  acetaminophen   Tablet .. 650 milliGRAM(s) Oral every 6 hours PRN Temp greater or equal to 38C (100.4F), Mild Pain (1 - 3), Moderate Pain (4 - 6)  ALBUTerol    90 MICROgram(s) HFA Inhaler 2 Puff(s) Inhalation every 6 hours PRN Shortness of Breath and/or Wheezing  aluminum hydroxide/magnesium hydroxide/simethicone Suspension 30 milliLiter(s) Oral every 6 hours PRN Dyspepsia  benzonatate 100 milliGRAM(s) Oral three times a day PRN Cough  chlorproMAZINE    Injectable 50 milliGRAM(s) IntraMuscular every 6 hours PRN severe bipolar realted psychotic aggression/agitation  chlorproMAZINE    Tablet 50 milliGRAM(s) Oral every 6 hours PRN bipolar related agitation/psychosis/ aggression  diphenhydrAMINE 50 milliGRAM(s) Oral every 6 hours PRN Extrapyramidal prophylaxis/anxiety/agitation due to psychosis  diphenhydrAMINE   Injectable 50 milliGRAM(s) IntraMuscular every 6 hours PRN Extrapyramidal prophylaxis/psychosis related severe agitation/ aggression  magnesium hydroxide Suspension 30 milliLiter(s) Oral daily PRN Constipation

## 2020-06-05 NOTE — PROGRESS NOTE BEHAVIORAL HEALTH - NSBHFUPINTERVALCCFT_PSY_A_CORE
.    Repeat COVID -19 PCR ordered 6/3/2020 at pt's request as above  . POSITIVE COVID  ( 6/3/2020)  Initial COVID + 5/27/2020)

## 2020-06-05 NOTE — CHART NOTE - NSCHARTNOTEFT_GEN_A_CORE
RN in 5N called to evaluate the patient to place on Seclusion in 5N. Evaluated the patient in 5N. Patient is alert and standing in the middle of the room. He is not in distress. No visible sing of any injury. As per the 5N RN patient is danger to the other people in the 5N unit. Despite multiple medications patient remained agitated and out of control. So Patient is placed seclusion as per protocol.

## 2020-06-05 NOTE — PROGRESS NOTE BEHAVIORAL HEALTH - OTHER
variable due to severity of his affective volatility unpredictably volatile and aggressive  and  easily provoked to extreme rage variable ' I couldn't sleep. I need to sleep>" ongoing  grandiose, somatic, persecutory  and paranoid delusional thinking, recent SI /HI though pt currently denies active plans pt currently denying AH /VH

## 2020-06-06 RX ADMIN — DIVALPROEX SODIUM 1500 MILLIGRAM(S): 500 TABLET, DELAYED RELEASE ORAL at 21:15

## 2020-06-06 RX ADMIN — Medication 500 MILLIGRAM(S): at 21:15

## 2020-06-06 RX ADMIN — Medication 200 MILLIGRAM(S): at 21:14

## 2020-06-06 RX ADMIN — Medication 50 MILLIGRAM(S): at 23:28

## 2020-06-06 RX ADMIN — Medication 30 MILLILITER(S): at 14:36

## 2020-06-06 RX ADMIN — Medication 5 MILLIGRAM(S): at 21:14

## 2020-06-08 DIAGNOSIS — F14.20 COCAINE DEPENDENCE, UNCOMPLICATED: ICD-10-CM

## 2020-06-08 LAB
BASOPHILS # BLD AUTO: 0.03 K/UL — SIGNIFICANT CHANGE UP (ref 0–0.2)
BASOPHILS NFR BLD AUTO: 0.5 % — SIGNIFICANT CHANGE UP (ref 0–2)
EOSINOPHIL # BLD AUTO: 0.08 K/UL — SIGNIFICANT CHANGE UP (ref 0–0.5)
EOSINOPHIL NFR BLD AUTO: 1.3 % — SIGNIFICANT CHANGE UP (ref 0–6)
HCT VFR BLD CALC: 42.9 % — SIGNIFICANT CHANGE UP (ref 39–50)
HGB BLD-MCNC: 14.5 G/DL — SIGNIFICANT CHANGE UP (ref 13–17)
IMM GRANULOCYTES NFR BLD AUTO: 0.3 % — SIGNIFICANT CHANGE UP (ref 0–1.5)
LYMPHOCYTES # BLD AUTO: 1.97 K/UL — SIGNIFICANT CHANGE UP (ref 1–3.3)
LYMPHOCYTES # BLD AUTO: 30.9 % — SIGNIFICANT CHANGE UP (ref 13–44)
MCHC RBC-ENTMCNC: 30.4 PG — SIGNIFICANT CHANGE UP (ref 27–34)
MCHC RBC-ENTMCNC: 33.8 GM/DL — SIGNIFICANT CHANGE UP (ref 32–36)
MCV RBC AUTO: 89.9 FL — SIGNIFICANT CHANGE UP (ref 80–100)
MONOCYTES # BLD AUTO: 0.7 K/UL — SIGNIFICANT CHANGE UP (ref 0–0.9)
MONOCYTES NFR BLD AUTO: 11 % — SIGNIFICANT CHANGE UP (ref 2–14)
NEUTROPHILS # BLD AUTO: 3.57 K/UL — SIGNIFICANT CHANGE UP (ref 1.8–7.4)
NEUTROPHILS NFR BLD AUTO: 56 % — SIGNIFICANT CHANGE UP (ref 43–77)
PLATELET # BLD AUTO: 168 K/UL — SIGNIFICANT CHANGE UP (ref 150–400)
RBC # BLD: 4.77 M/UL — SIGNIFICANT CHANGE UP (ref 4.2–5.8)
RBC # FLD: 12.6 % — SIGNIFICANT CHANGE UP (ref 10.3–14.5)
VALPROATE SERPL-MCNC: 68 UG/ML — SIGNIFICANT CHANGE UP (ref 50–100)
WBC # BLD: 6.37 K/UL — SIGNIFICANT CHANGE UP (ref 3.8–10.5)
WBC # FLD AUTO: 6.37 K/UL — SIGNIFICANT CHANGE UP (ref 3.8–10.5)

## 2020-06-08 PROCEDURE — 99232 SBSQ HOSP IP/OBS MODERATE 35: CPT

## 2020-06-08 RX ORDER — LITHIUM CARBONATE 300 MG/1
600 TABLET, EXTENDED RELEASE ORAL AT BEDTIME
Refills: 0 | Status: DISCONTINUED | OUTPATIENT
Start: 2020-06-08 | End: 2020-06-11

## 2020-06-08 RX ORDER — LITHIUM CARBONATE 300 MG/1
300 TABLET, EXTENDED RELEASE ORAL DAILY
Refills: 0 | Status: DISCONTINUED | OUTPATIENT
Start: 2020-06-09 | End: 2020-06-11

## 2020-06-08 RX ORDER — CHLORPROMAZINE HCL 10 MG
100 TABLET ORAL AT BEDTIME
Refills: 0 | Status: DISCONTINUED | OUTPATIENT
Start: 2020-06-08 | End: 2020-06-09

## 2020-06-08 RX ADMIN — Medication 30 MILLILITER(S): at 00:09

## 2020-06-08 RX ADMIN — Medication 30 MILLILITER(S): at 23:52

## 2020-06-08 RX ADMIN — LITHIUM CARBONATE 600 MILLIGRAM(S): 300 TABLET, EXTENDED RELEASE ORAL at 20:34

## 2020-06-08 RX ADMIN — Medication 30 MILLILITER(S): at 05:46

## 2020-06-08 RX ADMIN — Medication 30 MILLILITER(S): at 16:57
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
## 2020-06-08 NOTE — PROGRESS NOTE BEHAVIORAL HEALTH - PROBLEM SELECTOR PLAN 2
1.Monitor COVID labs   2.Supportive care  3.Monitor vital signs including Temp, HR, O2 Sats  4.To recheck COVID -19 PCR 6/3/2020. Result pending

## 2020-06-08 NOTE — PROGRESS NOTE BEHAVIORAL HEALTH - PRIMARY DX
Bipolar affective disorder, manic, severe, with psychotic behavior Bipolar affective disorder, mixed, severe, with psychotic behavior

## 2020-06-08 NOTE — PROGRESS NOTE BEHAVIORAL HEALTH - NSBHCHARTREVIEWVS_PSY_A_CORE FT
Vital Signs Last 24 Hrs  T(C): 36.3 (08 Jun 2020 07:23), Max: 36.3 (08 Jun 2020 07:23)  T(F): 97.3 (08 Jun 2020 07:23), Max: 97.3 (08 Jun 2020 07:23)  HR: --  BP: --  BP(mean): --  RR: --  SpO2: 97% (08 Jun 2020 07:23) (97% - 97%)
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
## 2020-06-08 NOTE — CHART NOTE - NSCHARTNOTEFT_GEN_A_CORE
Addendum  6/8/2020    After further discussion with the pt via ZOOM remote  and 5N staff tele presenter's assistance  on the unit with the pt, The pt agreed to a trial of Lithium to alleviate his severe bipolar d/o  diagnostic presentation.  The pt has recently begun to refuse all psychotropic medications including Depakote and  Thorazine .    Plan1. Lithium informed consent discussion with the pt begun as to Lithium's being a salt  with evidence based efficacy in the treatment of bipolar d/o.          2. DC Depakote due to pt refusal as above         3.Lithium 600 mg po qhs and 300 mg po q am ( to begin 6/9/2020)          4.Lower dose of  Thorazine  to 100 mg po qhs ( bipolar psychosis)           5.To check Lithium level, CBC with diff, CMP  on 6/12/2020           6.Will plan to consider the possible need for pt treatment over objection via the MH court if the pt again becomes med refusing due to the pt's severe bipolar affective d/o  with SI/ HI prior to admission and not yet fully resolved .

## 2020-06-08 NOTE — PROGRESS NOTE BEHAVIORAL HEALTH - PROBLEM SELECTOR PLAN 3
1.DC  Lexapro ( possible paradoxical agitation)  2.DC Risperdal due to lack of effiacy  3.DC Trazodone ( possible paradoxical agitation)  4.Rechecked  Depakote level on 6/4/2020 with increased  Depakote ER dose of  1000 mg po qhs ( bipolar d/o) PT NOW REFUSING ALL MEDICATIONS SINCE 6/7/2020   5.Melatonin 5 mg po qhs ( insomnia) REFUSED BY PT  6.Thorazine 200 mg po qhs  - REFUSED BY THE PT  7..SAFE ACT report to be filed due to pt reported HI as above 1.DC  Lexapro ( possible paradoxical agitation)  2.DC Risperdal due to lack of effiacy  3.DC Trazodone ( possible paradoxical agitation)  4.Rechecked  Depakote level on 6/4/2020 with increased  Depakote ER dose of  1000 mg po qhs ( bipolar d/o) PT NOW REFUSING ALL MEDICATIONS SINCE 6/7/2020   5.Melatonin 5 mg po qhs ( insomnia) REFUSED BY PT  6.Thorazine 200 mg po qhs  - REFUSED BY THE PT  7..SAFE ACT report to be filed due to pt reported HI as above  8. Plan to proceed with request for pt treatment over objection if the pt cannot be persuaded to allow for resumption of his medically necessary treatment for his severe bipolar d/o with mixed and psychotic features.

## 2020-06-08 NOTE — CHART NOTE - NSCHARTNOTEFT_GEN_A_CORE
c/o pain left toe x 1 day     L great toe tender at nail bed laterally'    No redness, heat or deformity no injury . no fever or chills     Plan   podiatry consult

## 2020-06-08 NOTE — PROGRESS NOTE BEHAVIORAL HEALTH - OTHER
variable due to severity of his affective volatility unpredictably volatile and aggressive  and  easily provoked to extreme rage variable ' I couldn't sleep. I need to sleep>" ongoing  grandiose, somatic, persecutory  and paranoid delusional thinking, recent SI /HI though pt currently denies active plans pt currently denying AH /VH ' I couldn't sleep. I need to sleep. I won't take medication because it makes me agitated. "

## 2020-06-08 NOTE — PROGRESS NOTE BEHAVIORAL HEALTH - PROBLEM SELECTOR PLAN 1
1.Ongoing staff support and encouragement  2.Ongoing pt psychoeducation related to the importance of ongoing consistent treatment to decrease risk of clinical relapse.  3.Plan to move forward with treatment over objection if the pt continues to refuse all meds including his antipsychotic medications necessary to treat the pt's functionally impairing bipolar d/o with psychotic features

## 2020-06-09 PROCEDURE — 99232 SBSQ HOSP IP/OBS MODERATE 35: CPT

## 2020-06-09 RX ADMIN — Medication 1 APPLICATION(S): at 20:31

## 2020-06-09 RX ADMIN — Medication 1 APPLICATION(S): at 14:16

## 2020-06-09 NOTE — PROGRESS NOTE BEHAVIORAL HEALTH - NSBHCHARTREVIEWVS_PSY_A_CORE FT
Vital Signs Last 24 Hrs  T(C): 36.3 (08 Jun 2020 07:23), Max: 36.3 (08 Jun 2020 07:23)  T(F): 97.3 (08 Jun 2020 07:23), Max: 97.3 (08 Jun 2020 07:23)  HR: --  BP: --  BP(mean): --  RR: --  SpO2: 97% (08 Jun 2020 07:23) (97% - 97%) Vital Signs Last 24 Hrs  T(C): 36.7 (09 Jun 2020 07:43), Max: 36.7 (09 Jun 2020 07:43)  T(F): 98.1 (09 Jun 2020 07:43), Max: 98.1 (09 Jun 2020 07:43)  HR: --  BP: --  BP(mean): --  RR: --  SpO2: 97% (09 Jun 2020 07:43) (97% - 97%)

## 2020-06-09 NOTE — PROGRESS NOTE BEHAVIORAL HEALTH - NSBHADMITIPREASONDETAILS_PSY_A_CORE FT
pt reporting both SI and HI as above on admission 5/27/2020
pt reporting both SI and HI as above
pt reporting both SI and HI as above on admission 5/27/2020
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
## 2020-06-09 NOTE — PROGRESS NOTE BEHAVIORAL HEALTH - OTHER
variable due to severity of his affective volatility unpredictably volatile and aggressive  and  easily provoked to extreme rage variable ' I couldn't sleep. I need to sleep. I won't take medication because it makes me agitated. " ongoing  grandiose, somatic, persecutory  and paranoid delusional thinking, recent SI /HI though pt currently denies active plans pt currently denying AH /VH angry, verbally abusive at times angry bursts of speech " I'm fine. I couldn't sleep because I took that medicine. I'm done with taking these medicines."

## 2020-06-09 NOTE — PROGRESS NOTE BEHAVIORAL HEALTH - NSBHCHARTREVIEWIMAGING_PSY_A_CORE FT
MEDICATIONS  (STANDING):  ascorbic acid 500 milliGRAM(s) Oral two times a day  chlorproMAZINE    Tablet 200 milliGRAM(s) Oral at bedtime  diVALproex ER 1500 milliGRAM(s) Oral at bedtime  melatonin 5 milliGRAM(s) Oral at bedtime    MEDICATIONS  (PRN):  acetaminophen   Tablet .. 650 milliGRAM(s) Oral every 6 hours PRN Temp greater or equal to 38C (100.4F), Mild Pain (1 - 3), Moderate Pain (4 - 6)  ALBUTerol    90 MICROgram(s) HFA Inhaler 2 Puff(s) Inhalation every 6 hours PRN Shortness of Breath and/or Wheezing  aluminum hydroxide/magnesium hydroxide/simethicone Suspension 30 milliLiter(s) Oral every 6 hours PRN Dyspepsia  benzonatate 100 milliGRAM(s) Oral three times a day PRN Cough  chlorproMAZINE    Injectable 50 milliGRAM(s) IntraMuscular every 6 hours PRN severe bipolar realted psychotic aggression/agitation  chlorproMAZINE    Tablet 50 milliGRAM(s) Oral every 6 hours PRN bipolar related agitation/psychosis/ aggression  diphenhydrAMINE 50 milliGRAM(s) Oral every 6 hours PRN Extrapyramidal prophylaxis/anxiety/agitation due to psychosis  diphenhydrAMINE   Injectable 50 milliGRAM(s) IntraMuscular every 6 hours PRN Extrapyramidal prophylaxis/psychosis related severe agitation/ aggression  magnesium hydroxide Suspension 30 milliLiter(s) Oral daily PRN Constipation MEDICATIONS  (STANDING):  ascorbic acid 500 milliGRAM(s) Oral two times a day  chlorproMAZINE    Tablet 100 milliGRAM(s) Oral at bedtime  clotrimazole 1% Cream 1 Application(s) Topical two times a day  lithium 600 milliGRAM(s) Oral at bedtime  lithium 300 milliGRAM(s) Oral daily  melatonin 5 milliGRAM(s) Oral at bedtime    MEDICATIONS  (PRN):  acetaminophen   Tablet .. 650 milliGRAM(s) Oral every 6 hours PRN Temp greater or equal to 38C (100.4F), Mild Pain (1 - 3), Moderate Pain (4 - 6)  ALBUTerol    90 MICROgram(s) HFA Inhaler 2 Puff(s) Inhalation every 6 hours PRN Shortness of Breath and/or Wheezing  aluminum hydroxide/magnesium hydroxide/simethicone Suspension 30 milliLiter(s) Oral every 6 hours PRN Dyspepsia  benzonatate 100 milliGRAM(s) Oral three times a day PRN Cough  chlorproMAZINE    Injectable 50 milliGRAM(s) IntraMuscular every 6 hours PRN severe bipolar realted psychotic aggression/agitation  chlorproMAZINE    Tablet 50 milliGRAM(s) Oral every 6 hours PRN bipolar related agitation/psychosis/ aggression  diphenhydrAMINE 50 milliGRAM(s) Oral every 6 hours PRN Extrapyramidal prophylaxis/anxiety/agitation due to psychosis  diphenhydrAMINE   Injectable 50 milliGRAM(s) IntraMuscular every 6 hours PRN Extrapyramidal prophylaxis/psychosis related severe agitation/ aggression  magnesium hydroxide Suspension 30 milliLiter(s) Oral daily PRN Constipation

## 2020-06-09 NOTE — PROGRESS NOTE BEHAVIORAL HEALTH - PROBLEM SELECTOR PLAN 3
1.DC  Lexapro ( possible paradoxical agitation)  2.DC Risperdal due to lack of effiacy  3.DC Trazodone ( possible paradoxical agitation)  4.Rechecked  Depakote level on 6/4/2020 with increased  Depakote ER dose of  1000 mg po qhs ( bipolar d/o) PT NOW REFUSING ALL MEDICATIONS SINCE 6/7/2020   5.Melatonin 5 mg po qhs ( insomnia) REFUSED BY PT  6.Thorazine 200 mg po qhs  - REFUSED BY THE PT  7..SAFE ACT report to be filed due to pt reported HI as above  8. Plan to proceed with request for pt treatment over objection if the pt cannot be persuaded to allow for resumption of his medically necessary treatment for his severe bipolar d/o with mixed and psychotic features. 1.DC  Lexapro ( possible paradoxical agitation)  2.DC Risperdal due to lack of effiacy  3.DC Trazodone ( possible paradoxical agitation)  4.DC of  Depakote due to pt mistaken belief that medication caused pt's mood lability and agitation rather than its being designed to treat his mood lability  5.Melatonin 5 mg po qhs ( insomnia) REFUSED BY PT  6.Pt refused Thorazine  lowered to 100 mg po qhs  - ALSO REFUSED BY THE PT  7..SAFE ACT report to be filed due to pt reported HI as above  8. Plan to consider the need to proceed with request for pt treatment over objection if the pt cannot be persuaded to allow for resumption of his medically necessary treatment for his severe bipolar d/o with mixed and psychotic features.  9.Pt had agreed to a trial of Lithium 300 mg po q am and 600 mg po qhs. Pt took initial doses and couldn't sleep so he mistakenly assumed that Lithium caused his inability to sleep .and now is refusing this medication as well.

## 2020-06-09 NOTE — CONSULT NOTE ADULT - ASSESSMENT
Assessment and Plan:  Assessment:   Patient is a 51 y/o M seen and treated by the Podiatry team for the followin. Onychomycosis of toenails 1-5 of the bilateral feet  2. Pain in the right toes 1-5  3. Pain in the left toes 1-5    Plan:  Patient evaluated and chart reviewed by Podiatry department.    After thorough examination of patient, aseptic mechanical debridement of toenails x 10 was performed using sterile nail clippers without incident. Alcohol prep was used to sterilize the toenails before and after debridement of nails.   Patient tolerated intervention well without any complications and expressed reduction in pain level to the toes of the right and left feet after aseptic nail debridement was performed.   Rx Clotrimazole cream to be applied to toenails x 10 once every morning and once every night for management of patient's onychomycosis by nursing. Nursing orders placed, appreciated.  Discussed diagnosis and treatment with patient. Discussed importance of daily foot examinations and proper shoe gear.  Patient advised he may follow up in office of Dr. Hurst upon discharge for continued podiatric care.  Patient demonstrated verbal understanding of all interventions and tolerated all interventions well and without incident.  Patient expressed satisfaction with podiatric treatment.  No further interventions by Podiatry at this time. Please re-consult as needed. Podiatry appreciates this consult.
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
## 2020-06-10 LAB — SARS-COV-2 RNA SPEC QL NAA+PROBE: SIGNIFICANT CHANGE UP

## 2020-06-10 PROCEDURE — 99232 SBSQ HOSP IP/OBS MODERATE 35: CPT

## 2020-06-10 RX ADMIN — Medication 1 APPLICATION(S): at 21:24

## 2020-06-10 RX ADMIN — Medication 5 MILLIGRAM(S): at 21:24

## 2020-06-10 RX ADMIN — Medication 1 APPLICATION(S): at 09:05

## 2020-06-10 NOTE — PROGRESS NOTE BEHAVIORAL HEALTH - NSBHADMITDANGERSELF_PSY_A_CORE
due tp comorbid bipolar d/o and substance use d/o/unable to care for self
unable to care for self/due tp comorbid bipolar d/o and substance use d/o

## 2020-06-10 NOTE — PROGRESS NOTE BEHAVIORAL HEALTH - PROBLEM SELECTOR PLAN 2
1.Monitor COVID labs   2.Supportive care  3.Monitor vital signs including Temp, HR, O2 Sats  4.Rechecked  COVID -19 PCR 6/9/2020  NEGATIVE  5.Recheck COVID -19 6/10/2020 . Result pending
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
## 2020-06-10 NOTE — PROGRESS NOTE BEHAVIORAL HEALTH - PROBLEM SELECTOR PLAN 3
1.DC  Lexapro ( possible paradoxical agitation)  2.DC Risperdal due to lack of efficacy  3.DC Trazodone ( possible paradoxical agitation)  4.DC of  Depakote due to pt refusal related to the pt's  mistaken belief that medication caused pt's mood lability and agitation rather than its being designed to treat his mood lability  5.Melatonin 5 mg po qhs ( insomnia) REFUSED BY PT  6.Pt refused Thorazine  lowered to 100 mg po qhs  - ALSO REFUSED BY THE PT  7..SAFE ACT report to be filed due to pt reported HI as above  8. Plan to consider the need to proceed with request for pt treatment over objection if the pt cannot be persuaded to allow for resumption of his medically necessary treatment for his severe bipolar d/o with mixed and psychotic features.  9.Pt had agreed to a trial of Lithium 300 mg po q am and 600 mg po qhs. Pt took initial doses and couldn't sleep so he mistakenly assumed that Lithium caused his inability to sleep .and now REFUSING this medication as well.

## 2020-06-10 NOTE — PROGRESS NOTE BEHAVIORAL HEALTH - NSBHFUPINTERVALCCFT_PSY_A_CORE
. " I'm not taking any medicine anymore! It makes me agitated and  I can't sleep. You better test me again for the virus so I can get out of here."       Repeat COVID -19 test 6/9/2020 at the pt's request NEGATIVE  COVID -19 retest on 6/10/2020 pending

## 2020-06-10 NOTE — PROGRESS NOTE BEHAVIORAL HEALTH - NSBHCHARTREVIEWIMAGING_PSY_A_CORE FT
MEDICATIONS  (STANDING):  ascorbic acid 500 milliGRAM(s) Oral two times a day  chlorproMAZINE    Tablet 100 milliGRAM(s) Oral at bedtime  clotrimazole 1% Cream 1 Application(s) Topical two times a day  lithium 600 milliGRAM(s) Oral at bedtime                                     Pt now refusing newly begun Lithium along with all medications ordered  including Melatonin for insomnia.  lithium 300 milliGRAM(s) Oral daily  melatonin 5 milliGRAM(s) Oral at bedtime    MEDICATIONS  (PRN):  acetaminophen   Tablet .. 650 milliGRAM(s) Oral every 6 hours PRN Temp greater or equal to 38C (100.4F), Mild Pain (1 - 3), Moderate Pain (4 - 6)  ALBUTerol    90 MICROgram(s) HFA Inhaler 2 Puff(s) Inhalation every 6 hours PRN Shortness of Breath and/or Wheezing  aluminum hydroxide/magnesium hydroxide/simethicone Suspension 30 milliLiter(s) Oral every 6 hours PRN Dyspepsia  benzonatate 100 milliGRAM(s) Oral three times a day PRN Cough  chlorproMAZINE    Injectable 50 milliGRAM(s) IntraMuscular every 6 hours PRN severe bipolar realted psychotic aggression/agitation  chlorproMAZINE    Tablet 50 milliGRAM(s) Oral every 6 hours PRN bipolar related agitation/psychosis/ aggression  diphenhydrAMINE 50 milliGRAM(s) Oral every 6 hours PRN Extrapyramidal prophylaxis/anxiety/agitation due to psychosis  diphenhydrAMINE   Injectable 50 milliGRAM(s) IntraMuscular every 6 hours PRN Extrapyramidal prophylaxis/psychosis related severe agitation/ aggression  magnesium hydroxide Suspension 30 milliLiter(s) Oral daily PRN Constipation

## 2020-06-10 NOTE — PROGRESS NOTE BEHAVIORAL HEALTH - NSBHCHARTREVIEWVS_PSY_A_CORE FT
Vital Signs Last 24 Hrs  T(C): 36.7 (10 Flaquito 2020 07:28), Max: 36.7 (10 Flaquito 2020 07:28)  T(F): 98 (10 Flaquito 2020 07:28), Max: 98 (10 Flaquito 2020 07:28)  HR: --  BP: --  BP(mean): --  RR: 18 (10 Flaquito 2020 07:28) (18 - 18)  SpO2: 99% (10 Flaquito 2020 07:28) (99% - 99%)

## 2020-06-10 NOTE — PROGRESS NOTE BEHAVIORAL HEALTH - PROBLEM SELECTOR PROBLEM 3
Bipolar affective disorder, mixed, severe, with psychotic behavior
COVID-19 virus infection
Bipolar affective disorder, mixed, severe, with psychotic behavior
COVID-19 virus infection
Bipolar affective disorder, mixed, severe, with psychotic behavior

## 2020-06-10 NOTE — PROGRESS NOTE BEHAVIORAL HEALTH - PROBLEM SELECTOR PLAN 1
1.Ongoing staff support and encouragement  2.Ongoing pt psychoeducation related to the importance of ongoing consistent treatment to decrease risk of clinical relapse.  3.Plan discussed to move forward with treatment over objection if the pt continues to refuse all meds including his antipsychotic medications necessary to treat the pt's functionally impairing bipolar d/o with psychotic features

## 2020-06-10 NOTE — PROGRESS NOTE BEHAVIORAL HEALTH - SECONDARY DX3
COVID-19 virus infection

## 2020-06-10 NOTE — PROGRESS NOTE BEHAVIORAL HEALTH - SECONDARY DX4
skin avulsion/flap excised at base, not salvageable/No foreign body/The wound was explored to base in bloodless field.
Cocaine use disorder, severe, in controlled environment

## 2020-06-10 NOTE — PROGRESS NOTE BEHAVIORAL HEALTH - PROBLEM SELECTOR PROBLEM 2
COVID-19 virus infection
Nonadherence to medical treatment
COVID-19 virus infection
Nonadherence to medical treatment
COVID-19 virus infection

## 2020-06-10 NOTE — PROGRESS NOTE BEHAVIORAL HEALTH - SECONDARY DX2
Nonadherence to medical treatment

## 2020-06-10 NOTE — PROGRESS NOTE BEHAVIORAL HEALTH - SECONDARY DX1
Opioid use disorder, severe, in sustained remission

## 2020-06-11 ENCOUNTER — INPATIENT (INPATIENT)
Facility: HOSPITAL | Age: 50
LOS: 10 days | Discharge: HOME | End: 2020-06-22
Attending: PSYCHIATRY & NEUROLOGY | Admitting: PSYCHIATRY & NEUROLOGY
Payer: MEDICAID

## 2020-06-11 VITALS — HEIGHT: 70 IN | OXYGEN SATURATION: 99 % | TEMPERATURE: 97 F | RESPIRATION RATE: 16 BRPM | WEIGHT: 214.51 LBS

## 2020-06-11 VITALS — TEMPERATURE: 98 F | OXYGEN SATURATION: 100 % | RESPIRATION RATE: 14 BRPM

## 2020-06-11 DIAGNOSIS — F31.2 BIPOLAR DISORDER, CURRENT EPISODE MANIC SEVERE WITH PSYCHOTIC FEATURES: ICD-10-CM

## 2020-06-11 PROBLEM — F32.9 MAJOR DEPRESSIVE DISORDER, SINGLE EPISODE, UNSPECIFIED: Chronic | Status: ACTIVE | Noted: 2020-05-27

## 2020-06-11 LAB — SARS-COV-2 RNA SPEC QL NAA+PROBE: SIGNIFICANT CHANGE UP

## 2020-06-11 PROCEDURE — 99239 HOSP IP/OBS DSCHRG MGMT >30: CPT

## 2020-06-11 PROCEDURE — 99222 1ST HOSP IP/OBS MODERATE 55: CPT

## 2020-06-11 RX ORDER — ACETAMINOPHEN 500 MG
2 TABLET ORAL
Qty: 0 | Refills: 0 | DISCHARGE
Start: 2020-06-11

## 2020-06-11 RX ORDER — LITHIUM CARBONATE 300 MG/1
1 TABLET, EXTENDED RELEASE ORAL
Qty: 0 | Refills: 0 | DISCHARGE
Start: 2020-06-11

## 2020-06-11 RX ORDER — HALOPERIDOL DECANOATE 100 MG/ML
5 INJECTION INTRAMUSCULAR EVERY 4 HOURS
Refills: 0 | Status: DISCONTINUED | OUTPATIENT
Start: 2020-06-11 | End: 2020-06-22

## 2020-06-11 RX ORDER — LANOLIN ALCOHOL/MO/W.PET/CERES
5 CREAM (GRAM) TOPICAL AT BEDTIME
Refills: 0 | Status: DISCONTINUED | OUTPATIENT
Start: 2020-06-11 | End: 2020-06-12

## 2020-06-11 RX ORDER — DIPHENHYDRAMINE HCL 50 MG
1 CAPSULE ORAL
Qty: 0 | Refills: 0 | DISCHARGE
Start: 2020-06-11

## 2020-06-11 RX ORDER — LITHIUM CARBONATE 300 MG/1
300 TABLET, EXTENDED RELEASE ORAL DAILY
Refills: 0 | Status: DISCONTINUED | OUTPATIENT
Start: 2020-06-11 | End: 2020-06-12

## 2020-06-11 RX ORDER — LANOLIN ALCOHOL/MO/W.PET/CERES
1 CREAM (GRAM) TOPICAL
Qty: 0 | Refills: 0 | DISCHARGE
Start: 2020-06-11

## 2020-06-11 RX ORDER — MAGNESIUM HYDROXIDE 400 MG/1
30 TABLET, CHEWABLE ORAL DAILY
Refills: 0 | Status: DISCONTINUED | OUTPATIENT
Start: 2020-06-11 | End: 2020-06-22

## 2020-06-11 RX ORDER — HALOPERIDOL DECANOATE 100 MG/ML
5 INJECTION INTRAMUSCULAR ONCE
Refills: 0 | Status: DISCONTINUED | OUTPATIENT
Start: 2020-06-11 | End: 2020-06-22

## 2020-06-11 RX ORDER — ACETAMINOPHEN 500 MG
650 TABLET ORAL EVERY 6 HOURS
Refills: 0 | Status: DISCONTINUED | OUTPATIENT
Start: 2020-06-11 | End: 2020-06-22

## 2020-06-11 RX ORDER — MAGNESIUM HYDROXIDE 400 MG/1
30 TABLET, CHEWABLE ORAL
Qty: 0 | Refills: 0 | DISCHARGE
Start: 2020-06-11

## 2020-06-11 RX ORDER — DIPHENHYDRAMINE HCL 50 MG
50 CAPSULE ORAL EVERY 6 HOURS
Refills: 0 | Status: DISCONTINUED | OUTPATIENT
Start: 2020-06-11 | End: 2020-06-22

## 2020-06-11 RX ORDER — LITHIUM CARBONATE 300 MG/1
600 TABLET, EXTENDED RELEASE ORAL AT BEDTIME
Refills: 0 | Status: DISCONTINUED | OUTPATIENT
Start: 2020-06-11 | End: 2020-06-12

## 2020-06-11 RX ORDER — CHLORPROMAZINE HCL 10 MG
1 TABLET ORAL
Qty: 0 | Refills: 0 | DISCHARGE
Start: 2020-06-11

## 2020-06-11 RX ADMIN — LITHIUM CARBONATE 600 MILLIGRAM(S): 300 TABLET, EXTENDED RELEASE ORAL at 20:34

## 2020-06-11 RX ADMIN — Medication 1 APPLICATION(S): at 08:48

## 2020-06-11 RX ADMIN — Medication 5 MILLIGRAM(S): at 20:34

## 2020-06-11 RX ADMIN — HALOPERIDOL DECANOATE 5 MILLIGRAM(S): 100 INJECTION INTRAMUSCULAR at 20:34

## 2020-06-11 NOTE — PROGRESS NOTE BEHAVIORAL HEALTH - NSBHCHARTREVIEWLAB_PSY_A_CORE FT
CBC  CHEM--WNL
CBC Full  -  ( 08 Jun 2020 07:43 )  WBC Count : 6.37 K/uL  RBC Count : 4.77 M/uL  Hemoglobin : 14.5 g/dL  Hematocrit : 42.9 %  Platelet Count - Automated : 168 K/uL  Mean Cell Volume : 89.9 fl  Mean Cell Hemoglobin : 30.4 pg  Mean Cell Hemoglobin Concentration : 33.8 gm/dL  Auto Neutrophil # : 3.57 K/uL  Auto Lymphocyte # : 1.97 K/uL  Auto Monocyte # : 0.70 K/uL  Auto Eosinophil # : 0.08 K/uL  Auto Basophil # : 0.03 K/uL  Auto Neutrophil % : 56.0 %  Auto Lymphocyte % : 30.9 %  Auto Monocyte % : 11.0 %  Auto Eosinophil % : 1.3 %  Auto Basophil % : 0.5 %
CBC Full  -  ( 27 May 2020 09:50 )  WBC Count : 6.49 K/uL  RBC Count : 4.56 M/uL  Hemoglobin : 13.5 g/dL  Hematocrit : 39.7 %  Platelet Count - Automated : 234 K/uL  Mean Cell Volume : 87.1 fl  Mean Cell Hemoglobin : 29.6 pg  Mean Cell Hemoglobin Concentration : 34.0 gm/dL  Auto Neutrophil # : 4.16 K/uL  Auto Lymphocyte # : 1.69 K/uL  Auto Monocyte # : 0.51 K/uL  Auto Eosinophil # : 0.09 K/uL  Auto Basophil # : 0.03 K/uL  Auto Neutrophil % : 64.0 %  Auto Lymphocyte % : 26.0 %  Auto Monocyte % : 7.9 %  Auto Eosinophil % : 1.4 %  Auto Basophil % : 0.5 %        137  |  103  |  8   ----------------------------<  89  3.5   |  22  |  0.87    Ca    9.3      27 May 2020 09:50    TPro  6.9  /  Alb  4.0  /  TBili  0.9  /  DBili  x   /  AST  27  /  ALT  62<H>  /  AlkPhos  67        Urinalysis Basic - ( 27 May 2020 13:31 )    Color: Yellow / Appearance: Clear / S.025 / pH: x  Gluc: x / Ketone: Trace mg/dL  / Bili: Small / Urobili: 1.0 E.U./dL   Blood: x / Protein: Trace mg/dL / Nitrite: NEGATIVE   Leuk Esterase: NEGATIVE / RBC: < 5 /HPF / WBC < 5 /HPF   Sq Epi: x / Non Sq Epi: 0-5 /HPF / Bacteria: Present /HPF
Vital Signs Last 24 Hrs  T(C): 36.3 (04 Jun 2020 07:22), Max: 36.3 (04 Jun 2020 07:22)  T(F): 97.4 (04 Jun 2020 07:22), Max: 97.4 (04 Jun 2020 07:22)  HR: --  BP: --  BP(mean): --  RR: 16 (04 Jun 2020 07:22) (16 - 16)  SpO2: 100% (04 Jun 2020 07:22) (100% - 100%)

## 2020-06-11 NOTE — PROGRESS NOTE BEHAVIORAL HEALTH - THOUGHT PROCESS
Illogical/Impaired reasoning/Disorganized
Disorganized
Disorganized
Illogical/Disorganized/Impaired reasoning
Impaired reasoning/Illogical/Disorganized
Disorganized/Illogical/Impaired reasoning
Disorganized/Illogical/Impaired reasoning
Disorganized/Other
Illogical/Disorganized/Impaired reasoning
Illogical/Impaired reasoning/Disorganized
Illogical/Impaired reasoning/Disorganized
Disorganized
Disorganized/Other

## 2020-06-11 NOTE — DISCHARGE NOTE BEHAVIORAL HEALTH - NSBHDCMEDSFT_PSY_A_CORE
MEDICATIONS  (STANDING):  ascorbic acid 500 milliGRAM(s) Oral two times a day  clotrimazole 1% Cream 1 Application(s) Topical two times a day  lithium 600 milliGRAM(s) Oral at bedtime  lithium 300 milliGRAM(s) Oral daily  melatonin 5 milliGRAM(s) Oral at bedtime    MEDICATIONS  (PRN):  acetaminophen   Tablet .. 650 milliGRAM(s) Oral every 6 hours PRN Temp greater or equal to 38C (100.4F), Mild Pain (1 - 3), Moderate Pain (4 - 6)  ALBUTerol    90 MICROgram(s) HFA Inhaler 2 Puff(s) Inhalation every 6 hours PRN Shortness of Breath and/or Wheezing  aluminum hydroxide/magnesium hydroxide/simethicone Suspension 30 milliLiter(s) Oral every 6 hours PRN Dyspepsia  benzonatate 100 milliGRAM(s) Oral three times a day PRN Cough  chlorproMAZINE    Injectable 50 milliGRAM(s) IntraMuscular every 6 hours PRN severe bipolar realted psychotic aggression/agitation  chlorproMAZINE    Tablet 50 milliGRAM(s) Oral every 6 hours PRN bipolar related agitation/psychosis/ aggression  diphenhydrAMINE 50 milliGRAM(s) Oral every 6 hours PRN Extrapyramidal prophylaxis/anxiety/agitation due to psychosis  diphenhydrAMINE   Injectable 50 milliGRAM(s) IntraMuscular every 6 hours PRN Extrapyramidal prophylaxis/psychosis related severe agitation/ aggression  magnesium hydroxide Suspension 30 milliLiter(s) Oral daily PRN Constipation MEDICATIONS  (STANDING):  PATIENT CONTINUES TO REFUSE ALL PSYCHIATRIC MEDICATIONS  clotrimazole 1% Cream 1 Application(s) Topical two times a day  lithium 600 milliGRAM(s) Oral at bedtime- REFUSED AFTER 1 DAY  lithium 300 milliGRAM(s) Oral daily-REFUSED AFTER 1 DOSE  melatonin 5 milliGRAM(s) Oral at bedtime    MEDICATIONS  (PRN):  acetaminophen   Tablet .. 650 milliGRAM(s) Oral every 6 hours PRN Temp greater or equal to 38C (100.4F), Mild Pain (1 - 3), Moderate Pain (4 - 6  aluminum hydroxide/magnesium hydroxide/simethicone Suspension 30 milliLiter(s) Oral every 6 hours PRN Dyspepsia  chlorproMAZINE    Tablet 50 milliGRAM(s) Oral every 6 hours PRN bipolar related agitation/psychosis/ aggression  diphenhydrAMINE 50 milliGRAM(s) Oral every 6 hours PRN Extrapyramidal prophylaxis/anxiety/agitation due to psychosis  magnesium hydroxide Suspension 30 milliLiter(s) Oral daily PRN Constipation   The pt. has been educated to continue the medications until told by his provider to stop

## 2020-06-11 NOTE — DISCHARGE NOTE BEHAVIORAL HEALTH - NSBHDCTHERAPYFT_PSY_A_CORE
Individual and group therapy, medication management, psychoeducation, safety planning, discharge planning.

## 2020-06-11 NOTE — DISCHARGE NOTE BEHAVIORAL HEALTH - NSBHDCCRISISPROB1FT_PSY_A_CORE
Any thoughts of hurting yourself or others or continuation of acute symptoms you had prior to admission to the hospital.

## 2020-06-11 NOTE — DISCHARGE NOTE BEHAVIORAL HEALTH - NSBHDCCASEMGRFT_PSY_A_CORE
[facility]  ([phone])  Feliz Blanchard [facility] Care Coordinator  ([phone]) Virgilio Vitale, [facility]  ([phone])  Feliz Blanchard [facility] Care Coordinator  ([phone])

## 2020-06-11 NOTE — DISCHARGE NOTE BEHAVIORAL HEALTH - FAMILY HISTORY OF PSYCHIATRIC ILLNESS
· 	Pt was born and raised in [place] . He completed high school there but no college. Pt came to the   28 years ago  and had been living in [place]  on [address] with his wife and 3 children. The pt  and his wife  after 20 years of marriage. 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  Pt reported a long h/o antagonism between him and this cousin.  ·	The pt. moved to the  from [place] 28 years ago. He and his wife  had   been living in [place]  on [address] with his wife and 3 children. 2 daughters , one in college. One in . One son in college. The pt has 2 older brothers. Pt states he is close to the brother who still resides in [place].  · · 	Pt was born and raised in [place] . He completed high school there but no college. Pt came [place] the [place]  28 [place] ago from [place]  and was  living in [place]  on [address] with his wife and 3 children. until they  about 10 years ago.  Pt reports living with friends and in cheap hotels for the last 10 years.  Pt states he was recently living with his cousin. 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  Pt reported a long h/o antagonism between him and this cousin.  Pt has 3 children: two daughters, one in HS and one in college, and one son in college.  Pt has two older brothers, is close to his brother in [place].  Parents live in [place].  Father is reportedly in the hospital.  Pt is presently homeless, has no income.   ·  ·

## 2020-06-11 NOTE — PROGRESS NOTE BEHAVIORAL HEALTH - ORIENTATION OTHER
Pt still mistakenly believes he was admitted to hospital due to COVID+ infection

## 2020-06-11 NOTE — DISCHARGE NOTE BEHAVIORAL HEALTH - NSBHDCSWCOMMENTSFT_PSY_A_CORE
Mr. Lakhani was educated about need for continuing inpatient mental health treatment and order of transfer to [facility].  He was also educated about recommendation for treatment with medication and was advised not to stop taking any medication unless told to do so by a physician.  However, pt is presently refusing medication.  Pt was also educated about safety precautions for Covid 19 including hand hygiene, social distancing and wearing a mask.

## 2020-06-11 NOTE — PROGRESS NOTE BEHAVIORAL HEALTH - NS ED BHA MED ROS RESPIRATORY
No complaints
Yes

## 2020-06-11 NOTE — DISCHARGE NOTE BEHAVIORAL HEALTH - SECONDARY DIAGNOSIS.
Nonadherence to medical treatment COVID-19 virus infection Cocaine use disorder, severe, in controlled environment Opioid use disorder, severe, in sustained remission

## 2020-06-11 NOTE — PROGRESS NOTE BEHAVIORAL HEALTH - THOUGHT CONTENT
Preoccupations/Other/Ruminations/Delusions
Delusions/Preoccupations/Ruminations/Other
Delusions/Ruminations/Preoccupations/Other
Suicidality/Homicidality
Suicidality/Homicidality
Preoccupations/Other/Ruminations/Delusions
Preoccupations/Ruminations/Delusions/Other
Delusions/Preoccupations/Other/Ruminations
Preoccupations/Other/Delusions/Ruminations
Preoccupations/Other/Delusions/Ruminations
Ruminations/Delusions/Preoccupations/Other
Suicidality/Homicidality
Preoccupations/Delusions/Other/Ruminations
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
## 2020-06-11 NOTE — DISCHARGE NOTE BEHAVIORAL HEALTH - NSBHDCVIOLFCTROTHERFT_PSY_A_CORE
social estrangement  and isolation   financial stressors, homelessness  return of drug cravings  occupational disagreements

## 2020-06-11 NOTE — DISCHARGE NOTE BEHAVIORAL HEALTH - NSBHDCCRISISPLAN2FT_PSY_A_CORE
Discuss with staff at [facility].    You may call [facility]. Number below.

## 2020-06-11 NOTE — PROGRESS NOTE BEHAVIORAL HEALTH - NS ED BHA MSE SPEECH ARTICULATION
Impaired
Normal
Impaired
Normal
Impaired

## 2020-06-11 NOTE — DISCHARGE NOTE BEHAVIORAL HEALTH - NSBHDCLABSFT_PSY_A_CORE
Repeat CBC, CMP ordered for 6/12/2020  TSH, Fasting lipids , HgA1C ordered for 6/12/2020  Lithium level ordered for 6/12/2020 but  PATIENT TOOK ONLY ONE DAY OF LITHIUM BEFORE REFUSING FURTHER MEDICATIONS

## 2020-06-11 NOTE — DISCHARGE NOTE BEHAVIORAL HEALTH - NSBHDCVIOLSAFETYFT_PSY_A_CORE
1.Safety planning reviewed   2.Pt for direct transfer to another hospital for ongoing inpatient treatment of his bipolar d/o and comorbid polysubstance use d/o  3.Pt to receive 24/7 tel list of Crisis Hotline numbers to contact with future safety concerns  4.Pt aware he can always return 24/7 to the nearest hospital ED once discharged from hospital for emergency evaluation should new safety concerns arise

## 2020-06-11 NOTE — PROGRESS NOTE BEHAVIORAL HEALTH - AXIS III
HTN  currently stable on no meds)  COVID -19 +  ( 5/27/2020)
HTN  currently stable on no meds)  COVID -19 -  ( 06/11/2020)
HTN  currently stable on no meds)  COVID -19 +
HTN  currently stable on no meds)  COVID -19 +
HTN  currently stable on no meds)  COVID -19 +  ( 5/27/2020)
HTN  currently stable on no meds)  COVID -19 +  ( 5/27/2020)
HTN  currently stable on no meds)  COVID -19 +  ( 5/27/2020)  Rechecked COVID -19  ( 6/9/2020)  NEGATIVE  6/10/2020 retest COVID -19 PENDING
HTN  currently stable on no meds)  COVID -19 +
HTN  currently stable on no meds)  COVID -19 +  ( 5/27/2020)
HTN  currently stable on no meds)  COVID -19 +
HTN  currently stable on no meds)  COVID -19 +  ( 5/27/2020)
HTN  currently stable on no meds)  COVID -19 +  ( 5/27/2020)  Rechecked COVID -19  ( 6/3/2020)  POSITIVE
HTN  currently stable on no meds)  COVID -19 +  ( 5/27/2020)  Rechecked COVID -19  ( 6/3/2020)  POSITIVE

## 2020-06-11 NOTE — PROGRESS NOTE BEHAVIORAL HEALTH - NSBHADMITIPOBSFT_PSY_A_CORE
Routine Admission
Pt currently denies active  SI and HI

## 2020-06-11 NOTE — DISCHARGE NOTE BEHAVIORAL HEALTH - NSBHDCSUBSTHXFT_PSY_A_CORE
· Pt reported a  remote h/o severe heroin and crack cocaine use disorders  from age 42 to age 46.   Pt with + IVDA heroin and cocaine  up to 10 bags a day   Pt h/o drug rehab  in [place]  and a h/o treatment with Suboxone x 4 years.  Pt denied any cravings for some time · Pt reported a  remote h/o severe heroin and crack cocaine use disorders  from age 42 to age 46.   Pt with + IVDA heroin and cocaine  up to 10 bags a day. Pt states he has not used heroin but was using cocaine daily up until admission,.   Pt h/o drug rehab  in [place]  and a h/o treatment with Suboxone x 4 years.  Pt denied any cravings for some time upon admission but later reported cocaine abuse.    Pt states he has been in [facility] for rehab 4 years ago for 90 days and also at [facility] in [facility] in 2009 x 6 mos.  Pt states he wants to go to rehab upon d/c and would like to returnh to one of these facilities.

## 2020-06-11 NOTE — DISCHARGE NOTE BEHAVIORAL HEALTH - NSBHDCALCOHOLREFERFT_PSY_A_CORE
Mr. Lakhani will receive counseling for substance abuse as part of treatment provided at [facility].

## 2020-06-11 NOTE — DISCHARGE NOTE BEHAVIORAL HEALTH - NSBHDCVIOLFCTRMIT_PSY_A_CORE
pt offered access to dual diagnosis treatment   pt has been able to work x the past 25 years in [place]  Pt able to use coping skills when motivated

## 2020-06-11 NOTE — PROGRESS NOTE BEHAVIORAL HEALTH - NSBHCHARTREVIEWVS_PSY_A_CORE FT
Vital Signs Last 24 Hrs  T(C): 36.6 (11 Jun 2020 07:33), Max: 36.6 (11 Jun 2020 07:33)  T(F): 97.8 (11 Jun 2020 07:33), Max: 97.8 (11 Jun 2020 07:33)  HR: --  BP: --  BP(mean): --  RR: 14 (11 Jun 2020 07:33) (14 - 14)  SpO2: 100% (11 Jun 2020 07:33) (100% - 100%)

## 2020-06-11 NOTE — PROGRESS NOTE BEHAVIORAL HEALTH - NSBHPTASSESSDT_PSY_A_CORE
01-Jun-2020
02-Jun-2020
03-Jun-2020
05-Jun-2020
08-Jun-2020
09-Jun-2020
10-[place]-2020
11-Jun-2020 10:14
28-May-2020
29-May-2020
30-May-2020
31-May-2020
04-Jun-2020

## 2020-06-11 NOTE — PROGRESS NOTE BEHAVIORAL HEALTH - RISK ASSESSMENT
High for Assaultive/Threatening behavior
Moderate  risk of suicide  . acute worsening of chronic psychiatric illness due to psychosocial stressors, medication non-adherence, and/or lack of sleep  -adherence to medication, psychosocial stressors related to COVID, acute medical illness, high risk of danger to cousin  Mitigating factors- pt currently safely in hospital and amenable to restart of medications   Pt likely able to use coping and problem solving skills when clinically stable

## 2020-06-11 NOTE — DISCHARGE NOTE BEHAVIORAL HEALTH - NSBHDCTESTSFT_PSY_A_CORE
TSH, Fasting Lipids, HGA1C  ordered for 6/12/2020  Depakote level  ( 6/8/2020) =68 ug/ml ( PT HAS REFUSED DEPAKOTE SINCE 6/8/2020  Lithium level ordered for 6/12/2020 BUT PT TOOK 1 DAY OF LITHIUM BEFORE REFUSING FURTHER MEDICATION  Platelets= 168 K  COVID -19 + ( 5/27/2020 )  NEGATIVE 6/9/2020 AND 6/10/2020  XPF=417  D-dimer<150  Ferritin = 83  CRP < 0.1  Procalcitonin = 0.03

## 2020-06-11 NOTE — PROGRESS NOTE BEHAVIORAL HEALTH - NSBHCHARTREVIEWINVESTIGATE_PSY_A_CORE FT
< from: 12 Lead ECG (05.27.20 @ 09:21) >    Ventricular Rate 83 BPM    Atrial Rate 83 BPM    P-R Interval 164 ms    QRS Duration 94 ms    Q-T Interval 374 ms    QTC Calculation(Bezet) 439 ms    P Axis 68 degrees    R Axis 23 degrees    T Axis 38 degrees    Diagnosis Line Normal sinus rhythm    Confirmed by MICA GERMAN, ANTIONE ([id number]) on 5/28/2020 9:23:3

## 2020-06-11 NOTE — PROGRESS NOTE BEHAVIORAL HEALTH - NSBHLEGALSTATUS_PSY_A_CORE
9.27 (2PC)

## 2020-06-11 NOTE — PROGRESS NOTE BEHAVIORAL HEALTH - NS ED BHA MSE GENERAL APPEARANCE
No deformities present/Well developed
No deformities present/Well developed
Well developed/No deformities present
No deformities present/Well developed
Well developed/No deformities present
No deformities present/Well developed
No deformities present/Well developed
Well developed/No deformities present
Well developed/No deformities present
Well developed

## 2020-06-11 NOTE — PROGRESS NOTE BEHAVIORAL HEALTH - ESTIMATED DISCHARGE DATE
11-Jun-2020
12-Jun-2020
11-Jun-2020
12-Jun-2020
19-Jun-2020
27-Jun-2020
27-Jun-2020

## 2020-06-11 NOTE — DISCHARGE NOTE BEHAVIORAL HEALTH - NSBHDCPURPOSE1FT_PSY_A_CORE
Continuing inpatient mental health treatment on a Covid [facility].

## 2020-06-11 NOTE — DISCHARGE NOTE BEHAVIORAL HEALTH - PAST PSYCHIATRIC HISTORY
The pt. has a h/o entrenched treatment noncompliance for recurrent mood and thought d/o  Pt reported a h/o one prior suicide attempt years ago.  Pt most recently in outpatient treatment with Dr. Boone at [facility]  but pt reportedly noncompliant due to financial issues and current homelessness.

## 2020-06-11 NOTE — DISCHARGE NOTE BEHAVIORAL HEALTH - NSBHDCDXVALIDYESFT_PSY_A_CORE
Bipolar affective disorder- mixed with psychotic features  Cocaine use d/o - severe, in controlled envirnment  Opioid use d/o - severe, in early remission in controlled environment  Nonadherence with treatment  COVID - 19 infection ( = on 5/27/2020)  NEGATIVE 6/9/2020 AND 6/10/2020

## 2020-06-11 NOTE — PROGRESS NOTE BEHAVIORAL HEALTH - NSBHFUPVIOLOTHERS_PSY_A_CORE
none known
yes
none known

## 2020-06-11 NOTE — DISCHARGE NOTE BEHAVIORAL HEALTH - NSBHDCADMRISKREASONS_PSY_A_CORE
Poor residential stability/Poor engagement in outpt treatment/Non-adherence with medications/Co-occur mental health and subst use

## 2020-06-11 NOTE — PROGRESS NOTE BEHAVIORAL HEALTH - PERCEPTIONS
Other/No abnormalities
No abnormalities/Other/Auditory hallucinations
Other/No abnormalities/Auditory hallucinations
No abnormalities/Other
Other/No abnormalities
Other/No abnormalities
No abnormalities/Other
Auditory hallucinations/Other/No abnormalities
Other/No abnormalities
No abnormalities/Other

## 2020-06-11 NOTE — PROGRESS NOTE BEHAVIORAL HEALTH - BODY HABITUS
Well nourished/Average build
Average build/Well nourished
Average build/Well nourished
Well nourished/Average build
Average build/Well nourished
Well nourished

## 2020-06-11 NOTE — PROGRESS NOTE BEHAVIORAL HEALTH - NSBHADMITMEDEDUDETAILS_A_CORE FT
Discussed risks/Benefits/s-e
Informed consent process begun with the pt as to plan to resume the pt's SSRI antidepressant Lexapro and his SGA Risperdal  and Depakote  to help alleviate the pt's bipolar d/o symptoms
Informed consent process begun with the pt as to plan to resume the pt's SSRI antidepressant Lexapro and his SGA Risperdal  and Depakote  to help alleviate the pt's bipolar d/o symptoms
Informed consent process begun with the pt as to plan to resume the pt's SSRI antidepressant Lexapro and his SGA Risperdal  to help alleviate the pt's psychotic depressive symptoms
Informed consent process begun with the pt as to plan to resume the pt's SSRI antidepressant Lexapro and his SGA Risperdal  to help alleviate the pt's psychotic depressive symptoms
Informed consent process begun with the pt as to plan to resume the pt's SSRI antidepressant Lexapro and his SGA Risperdal  and Depakote  to help alleviate the pt's bipolar d/o symptoms
Informed consent process begun with the pt as to plan to resume the pt's SSRI antidepressant Lexapro and his SGA Risperdal  to help alleviate the pt's psychotic depressive symptoms
Informed consent process begun with the pt as to plan to resume the pt's SSRI antidepressant Lexapro and his SGA Risperdal  and Depakote  to help alleviate the pt's bipolar d/o symptoms

## 2020-06-11 NOTE — DISCHARGE NOTE BEHAVIORAL HEALTH - NSBHDCMEDICALFT_PSY_A_CORE
COVID -19 + 5/27/2020    COVID - 19 NEGATIVE TWICE ON 6/9/2020 AND 6/10/2020  h/o HTN  - Currently on no med regimen COVID -19 + 5/27/2020    COVID - 19 NEGATIVE TWICE ON 6/9/2020 AND 6/10/2020  h/o HTN  - Currently on no med regimen  Bilateral feet fungal infection- seen by Podiatry - Clotrimazole ordered

## 2020-06-11 NOTE — DISCHARGE NOTE BEHAVIORAL HEALTH - MEDICATION SUMMARY - MEDICATIONS TO TAKE
I will START or STAY ON the medications listed below when I get home from the hospital:    acetaminophen 325 mg oral tablet  -- 2 tab(s) by mouth every 6 hours, As needed, Temp greater or equal to 38C (100.4F), Mild Pain (1 - 3), Moderate Pain (4 - 6)  -- Indication: For Mild to moderate pain/ headache/ fever    aluminum hydroxide-magnesium hydroxide 200 mg-200 mg/5 mL oral suspension  -- 30 milliliter(s) by mouth every 6 hours, As needed, Dyspepsia  -- Indication: For Dyspepsia    magnesium hydroxide 8% oral suspension  -- 30 milliliter(s) by mouth once a day, As needed, Constipation  -- Indication: For Constipation    chlorproMAZINE 50 mg oral tablet  -- 1 tab(s) by mouth every 6 hours, As needed, bipolar related agitation/psychosis/ aggression  -- Indication: For Bipolar affective disorder, mixed, severe, with psychotic behavior    diphenhydrAMINE 50 mg oral capsule  -- 1 cap(s) by mouth every 6 hours, As needed, Extrapyramidal prophylaxis/anxiety/agitation due to psychosis  -- Indication: For Bipolar affective disorder, mixed, severe, with psychotic behavior    lithium 600 mg oral capsule  -- 1 cap(s) by mouth once a day (at bedtime)  -- Indication: For Bipolar affective disorder, manic, severe, with psychotic behavior    lithium 300 mg oral capsule  -- 1 cap(s) by mouth once a day  -- Indication: For Bipolar affective disorder, manic, severe, with psychotic behavior    clotrimazole 1% topical cream  -- 1 application on skin 2 times a day  -- Indication: For Bilateral feet fungal infection     melatonin 5 mg oral tablet  -- 1 tab(s) by mouth once a day (at bedtime)  -- Indication: For insomnia
no

## 2020-06-11 NOTE — PROGRESS NOTE BEHAVIORAL HEALTH - NS ED BHA REVIEW OF ED CHART AVAILABLE INVESTIGATIONS REVIEWED
None available
Yes
None available

## 2020-06-11 NOTE — PROGRESS NOTE BEHAVIORAL HEALTH - SUMMARY
Summary (include case differential, formulation and patient response to therapy): The pt. is a 50 year-old  WM with a h/o recurrent psychotic depression, remote severe opioid use d/o ( none x 20 years),and  treatment nonadherence who was BIB EMS to [facility] on 5/27/2020 , activated by the pt due to pt c/o cough and diarrhea  x 2 weeks.  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    When initially evaluated in the ED at [facility] on 5/27/2020, the   patient was only partially oriented, inattentive, with disorganized thought process, and fluctuating in mental status between alert and attentive (on primary team's interview) to drowsy and inattentive (on our interview). Also at that time, the pt was endorsing ideation, plan, and intent to kill is cousin, if he were to find out that his delaney COVID-19 was due to cousin.        Additionally, the pt was found to be COVID+ on 5/27/2020 and as a result, the pt was transferred on 5/27/2020 on a 2 PC involuntary legal status  to [address] , a designated all COVID = psychiatric unit for ongoing evaluation and treatment of his psychotic depression along with supportive care for his recently confirmed COVID . A SAFE ACT report will be made due to the pt's HI towards his cousin as above in light of the pt's recurrent psychotic depression and a h/o entrenched treatment noncompliance.
The pt. is a 50 year-old  WM with a h/o recurrent psychotic depression, remote severe opioid use d/o ( none x 20 years),and  treatment nonadherence who was BIB EMS to [facility] on 5/27/2020 , activated by the pt due to pt c/o cough and diarrhea  x 2 weeks.  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    When initially evaluated in the ED at [facility] on 5/27/2020, the   patient was only partially oriented, inattentive, with disorganized thought process, and fluctuating in mental status between alert and attentive (on primary team's interview) to drowsy and inattentive (on our interview). Also at that time, the pt was endorsing ideation, plan, and intent to kill is cousin, if he were to find out that his delaney COVID-19 was due to cousin.        Additionally, the pt was found to be COVID+ on 5/27/2020 and as a result, the pt was transferred on 5/27/2020 on a 2 PC involuntary legal status  to [address] , a designated all COVID = psychiatric unit for ongoing evaluation and treatment of his psychotic depression along with supportive care for his recently confirmed COVID . A SAFE ACT report will be made due to the pt's HI towards his cousin as above in light of the pt's recurrent psychotic depression and a h/o entrenched treatment noncompliance.
The pt. is a 50 year-old  WM with a h/o recurrent psychotic depression, remote severe opioid use d/o ( none x 20 years),and  treatment nonadherence who was BIB EMS to [facility] on 5/27/2020 , activated by the pt due to pt c/o cough and diarrhea  x 2 weeks.  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    When initially evaluated in the ED at [facility] on 5/27/2020, the   patient was only partially oriented, inattentive, with disorganized thought process, and fluctuating in mental status between alert and attentive (on primary team's interview) to drowsy and inattentive (on our interview). Also at that time, the pt was endorsing ideation, plan, and intent to kill is cousin, if he were to find out that his delaney COVID-19 was due to cousin.        Additionally, the pt was found to be COVID+ on 5/27/2020 and as a result, the pt was transferred on 5/27/2020 on a 2 PC involuntary legal status  to [address] , a designated all COVID = psychiatric unit for ongoing evaluation and treatment of his psychotic depression along with supportive care for his recently confirmed COVID . A SAFE ACT report will be made due to the pt's HI towards his cousin as above in light of the pt's recurrent psychotic depression and a h/o entrenched treatment noncompliance.
The pt. is a 50 year-old  WM with a h/o recurrent psychotic depression, remote severe opioid use d/o ( none x 20 years),and  treatment nonadherence who was BIB EMS to [facility] on 5/27/2020 , activated by the pt due to pt c/o cough and diarrhea  x 2 weeks.  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    When initially evaluated in the ED at [facility] on 5/27/2020, the   patient was only partially oriented, inattentive, with disorganized thought process, and fluctuating in mental status between alert and attentive (on primary team's interview) to drowsy and inattentive (on our interview). Also at that time, the pt was endorsing ideation, plan, and intent to kill is cousin, if he were to find out that his delaney COVID-19 was due to cousin.        Additionally, the pt was found to be COVID+ on 5/27/2020 and as a result, the pt was transferred on 5/27/2020 on a 2 PC involuntary legal status  to [address] , a designated all COVID = psychiatric unit for ongoing evaluation and treatment of his psychotic depression along with supportive care for his recently confirmed COVID . A SAFE ACT report will be made due to the pt's HI towards his cousin as above in light of the pt's recurrent psychotic depression and a h/o entrenched treatment noncompliance.
The pt. is a 50 year-old  WM with a h/o recurrent psychotic depression, remote severe opioid use d/o ( none x 20 years),and  treatment nonadherence who was BIB EMS to [facility] on 5/27/2020 , activated by the pt due to pt c/o cough and diarrhea  x 2 weeks.  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    When initially evaluated in the ED at [facility] on 5/27/2020, the   patient was only partially oriented, inattentive, with disorganized thought process, and fluctuating in mental status between alert and attentive (on primary team's interview) to drowsy and inattentive (on our interview). Also at that time, the pt was endorsing ideation, plan, and intent to kill is cousin, if he were to find out that his delaney COVID-19 was due to cousin.        Additionally, the pt was found to be COVID+ on 5/27/2020 and as a result, the pt was transferred on 5/27/2020 on a 2 PC involuntary legal status  to [address] , a designated all COVID = psychiatric unit for ongoing evaluation and treatment of his psychotic depression along with supportive care for his recently confirmed COVID . A SAFE ACT report will be made due to the pt's HI towards his cousin as above in light of the pt's recurrent psychotic depression and a h/o entrenched treatment noncompliance.
The pt. is a 50 year-old  WM with a h/o recurrent psychotic depression, remote severe opioid use d/o ( none x 20 years),and  treatment nonadherence who was BIB EMS to [facility] on 5/27/2020 , activated by the pt due to pt c/o cough and diarrhea  x 2 weeks.  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    When initially evaluated in the ED at [facility] on 5/27/2020, the   patient was only partially oriented, inattentive, with disorganized thought process, and fluctuating in mental status between alert and attentive (on primary team's interview) to drowsy and inattentive (on our interview). Also at that time, the pt was endorsing ideation, plan, and intent to kill is cousin, if he were to find out that his delaney COVID-19 was due to cousin.        Additionally, the pt was found to be COVID+ on 5/27/2020 and as a result, the pt was transferred on 5/27/2020 on a 2 PC involuntary legal status  to [address] , a designated all COVID = psychiatric unit for ongoing evaluation and treatment of his psychotic depression along with supportive care for his recently confirmed COVID . A SAFE ACT report will be made due to the pt's HI towards his cousin as above in light of the pt's recurrent psychotic depression and a h/o entrenched treatment noncompliance.
The pt. is a 50 year-old  WM with a h/o recurrent psychotic depression, remote severe opioid use d/o ( none x 20 years),and  treatment nonadherence who was BIB EMS to [facility] on 5/27/2020 , activated by the pt due to pt c/o cough and diarrhea  x 2 weeks.  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    When initially evaluated in the ED at [facility] on 5/27/2020, the   patient was only partially oriented, inattentive, with disorganized thought process, and fluctuating in mental status between alert and attentive (on primary team's interview) to drowsy and inattentive (on our interview). Also at that time, the pt was endorsing ideation, plan, and intent to kill is cousin, if he were to find out that his delaney COVID-19 was due to cousin.        Additionally, the pt was found to be COVID+ on 5/27/2020 and as a result, the pt was transferred on 5/27/2020 on a 2 PC involuntary legal status  to [address] , a designated all COVID = psychiatric unit for ongoing evaluation and treatment of his psychotic depression along with supportive care for his recently confirmed COVID . A SAFE ACT report will be made due to the pt's HI towards his cousin as above in light of the pt's recurrent psychotic depression and a h/o entrenched treatment noncompliance.
The pt. is a 50 year-old  WM with a h/o recurrent psychotic depression, remote severe opioid use d/o ( none x 20 years),and  treatment nonadherence who was BIB EMS to [facility] on 5/27/2020 , activated by the pt due to pt c/o cough and diarrhea  x 2 weeks.  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    When initially evaluated in the ED at [facility] on 5/27/2020, the   patient was only partially oriented, inattentive, with disorganized thought process, and fluctuating in mental status between alert and attentive (on primary team's interview) to drowsy and inattentive (on our interview). Also at that time, the pt was endorsing ideation, plan, and intent to kill is cousin, if he were to find out that his delaney COVID-19 was due to cousin.        Additionally, the pt was found to be COVID+ on 5/27/2020 and as a result, the pt was transferred on 5/27/2020 on a 2 PC involuntary legal status  to [address] , a designated all COVID = psychiatric unit for ongoing evaluation and treatment of his psychotic depression along with supportive care for his recently confirmed COVID . A SAFE ACT report will be made due to the pt's HI towards his cousin as above in light of the pt's recurrent psychotic depression and a h/o entrenched treatment noncompliance.
The pt. is a 50 year-old  WM with a h/o recurrent psychotic depression, remote severe opioid use d/o ( none x 20 years),and  treatment nonadherence who was BIB EMS to [facility] on 5/27/2020 , activated by the pt due to pt c/o cough and diarrhea  x 2 weeks.  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    When initially evaluated in the ED at [facility] on 5/27/2020, the   patient was only partially oriented, inattentive, with disorganized thought process, and fluctuating in mental status between alert and attentive (on primary team's interview) to drowsy and inattentive (on our interview). Also at that time, the pt was endorsing ideation, plan, and intent to kill is cousin, if he were to find out that his delaney COVID-19 was due to cousin.        Additionally, the pt was found to be COVID+ on 5/27/2020 and as a result, the pt was transferred on 5/27/2020 on a 2 PC involuntary legal status  to [address] , a designated all COVID = psychiatric unit for ongoing evaluation and treatment of his psychotic depression along with supportive care for his recently confirmed COVID . A SAFE ACT report will be made due to the pt's HI towards his cousin as above in light of the pt's recurrent psychotic depression and a h/o entrenched treatment noncompliance.
The pt. is a 50 year-old  WM with a h/o recurrent psychotic depression, remote severe opioid use d/o ( none x 20 years),and  treatment nonadherence who was BIB EMS to [facility] on 5/27/2020 , activated by the pt due to pt c/o cough and diarrhea  x 2 weeks.  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    When initially evaluated in the ED at [facility] on 5/27/2020, the   patient was only partially oriented, inattentive, with disorganized thought process, and fluctuating in mental status between alert and attentive (on primary team's interview) to drowsy and inattentive (on our interview). Also at that time, the pt was endorsing ideation, plan, and intent to kill is cousin, if he were to find out that his delaney COVID-19 was due to cousin.        Additionally, the pt was found to be COVID+ on 5/27/2020 and as a result, the pt was transferred on 5/27/2020 on a 2 PC involuntary legal status  to [address] , a designated all COVID = psychiatric unit for ongoing evaluation and treatment of his psychotic depression along with supportive care for his recently confirmed COVID . A SAFE ACT report will be made due to the pt's HI towards his cousin as above in light of the pt's recurrent psychotic depression and a h/o entrenched treatment noncompliance.
The pt. is a 50 year-old  WM with a h/o recurrent psychotic depression, remote severe opioid use d/o ( none x 20 years),and  treatment nonadherence who was BIB EMS to [facility] on 5/27/2020 , activated by the pt due to pt c/o cough and diarrhea  x 2 weeks.  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    When initially evaluated in the ED at [facility] on 5/27/2020, the   patient was only partially oriented, inattentive, with disorganized thought process, and fluctuating in mental status between alert and attentive (on primary team's interview) to drowsy and inattentive (on our interview). Also at that time, the pt was endorsing ideation, plan, and intent to kill is cousin, if he were to find out that his delaney COVID-19 was due to cousin.        Additionally, the pt was found to be COVID+ on 5/27/2020 and as a result, the pt was transferred on 5/27/2020 on a 2 PC involuntary legal status  to [address] , a designated all COVID = psychiatric unit for ongoing evaluation and treatment of his psychotic depression along with supportive care for his recently confirmed COVID . A SAFE ACT report will be made due to the pt's HI towards his cousin as above in light of the pt's recurrent psychotic depression and a h/o entrenched treatment noncompliance.
The pt. is a 50 year-old  WM with a h/o recurrent psychotic depression, remote severe opioid use d/o ( none x 20 years),and  treatment nonadherence who was BIB EMS to [facility] on 5/27/2020 , activated by the pt due to pt c/o cough and diarrhea  x 2 weeks.  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    When initially evaluated in the ED at [facility] on 5/27/2020, the   patient was only partially oriented, inattentive, with disorganized thought process, and fluctuating in mental status between alert and attentive (on primary team's interview) to drowsy and inattentive (on our interview). Also at that time, the pt was endorsing ideation, plan, and intent to kill is cousin, if he were to find out that his delaney COVID-19 was due to cousin.        Additionally, the pt was found to be COVID+ on 5/27/2020 and as a result, the pt was transferred on 5/27/2020 on a 2 PC involuntary legal status  to [address] , a designated all COVID = psychiatric unit for ongoing evaluation and treatment of his psychotic depression along with supportive care for his recently confirmed COVID . A SAFE ACT report will be made due to the pt's HI towards his cousin as above in light of the pt's recurrent psychotic depression and a h/o entrenched treatment noncompliance.
The pt. is a 50 year-old  WM with a h/o recurrent psychotic depression, remote severe opioid use d/o ( none x 20 years),and  treatment nonadherence who was BIB EMS to [facility] on 5/27/2020 , activated by the pt due to pt c/o cough and diarrhea  x 2 weeks.  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    When initially evaluated in the ED at [facility] on 5/27/2020, the   patient was only partially oriented, inattentive, with disorganized thought process, and fluctuating in mental status between alert and attentive (on primary team's interview) to drowsy and inattentive (on our interview). Also at that time, the pt was endorsing ideation, plan, and intent to kill is cousin, if he were to find out that his delaney COVID-19 was due to cousin.        Additionally, the pt was found to be COVID+ on 5/27/2020 and as a result, the pt was transferred on 5/27/2020 on a 2 PC involuntary legal status  to [address] , a designated all COVID = psychiatric unit for ongoing evaluation and treatment of his psychotic depression along with supportive care for his recently confirmed COVID . A SAFE ACT report will be made due to the pt's HI towards his cousin as above in light of the pt's recurrent psychotic depression and a h/o entrenched treatment noncompliance.

## 2020-06-11 NOTE — DISCHARGE NOTE BEHAVIORAL HEALTH - CONDITIONS AT DISCHARGE
Patient was seen and evaluated by treatment team and is transferred to [facility] for further inpatient hospitalization and treatment.  He is alert, ambulatory; no distress noted nor voiced by patient; he denies current suicidal/homicidal ideation, and denies auditory/visual hallucination.  Discharge and follow-up instructions explained and given to him and he verbalized understanding.  All belongings returned to him and he is dressed appropriately for d/c.  Patient currently awaiting ambulance for safe d/c.

## 2020-06-11 NOTE — CHART NOTE - NSCHARTNOTEFT_GEN_A_CORE
Patient being transferred to [facility] for continued treatment.  No follow up call required on 06/12/2020.

## 2020-06-12 LAB
CHOLEST SERPL-MCNC: 127 MG/DL — SIGNIFICANT CHANGE UP (ref 120–199)
HBA1C BLD-MCNC: 4.6 % — SIGNIFICANT CHANGE UP (ref 4–5.6)
HDLC SERPL-MCNC: 37 MG/DL — SIGNIFICANT CHANGE UP (ref 35–55)
LIPID PNL WITH DIRECT LDL SERPL: 76 MG/DL — SIGNIFICANT CHANGE UP
TRIGL SERPL-MCNC: 151 MG/DL — HIGH (ref 10–149)
TSH SERPL-MCNC: 4.82 UIU/ML — HIGH (ref 0.27–4.2)

## 2020-06-12 PROCEDURE — 99232 SBSQ HOSP IP/OBS MODERATE 35: CPT

## 2020-06-12 RX ORDER — LANOLIN ALCOHOL/MO/W.PET/CERES
3 CREAM (GRAM) TOPICAL ONCE
Refills: 0 | Status: COMPLETED | OUTPATIENT
Start: 2020-06-12 | End: 2020-06-13

## 2020-06-12 RX ORDER — LANOLIN ALCOHOL/MO/W.PET/CERES
5 CREAM (GRAM) TOPICAL AT BEDTIME
Refills: 0 | Status: DISCONTINUED | OUTPATIENT
Start: 2020-06-12 | End: 2020-06-22

## 2020-06-12 RX ORDER — LITHIUM CARBONATE 300 MG/1
900 TABLET, EXTENDED RELEASE ORAL AT BEDTIME
Refills: 0 | Status: DISCONTINUED | OUTPATIENT
Start: 2020-06-13 | End: 2020-06-22

## 2020-06-12 RX ORDER — TRAZODONE HCL 50 MG
50 TABLET ORAL AT BEDTIME
Refills: 0 | Status: DISCONTINUED | OUTPATIENT
Start: 2020-06-12 | End: 2020-06-15

## 2020-06-12 RX ORDER — LITHIUM CARBONATE 300 MG/1
600 TABLET, EXTENDED RELEASE ORAL AT BEDTIME
Refills: 0 | Status: COMPLETED | OUTPATIENT
Start: 2020-06-12 | End: 2020-06-12

## 2020-06-12 RX ADMIN — Medication 5 MILLIGRAM(S): at 20:54

## 2020-06-12 RX ADMIN — LITHIUM CARBONATE 600 MILLIGRAM(S): 300 TABLET, EXTENDED RELEASE ORAL at 20:54

## 2020-06-12 RX ADMIN — HALOPERIDOL DECANOATE 5 MILLIGRAM(S): 100 INJECTION INTRAMUSCULAR at 04:07

## 2020-06-12 RX ADMIN — Medication 50 MILLIGRAM(S): at 20:54

## 2020-06-12 RX ADMIN — Medication 50 MILLIGRAM(S): at 04:07

## 2020-06-12 RX ADMIN — LITHIUM CARBONATE 300 MILLIGRAM(S): 300 TABLET, EXTENDED RELEASE ORAL at 08:35

## 2020-06-12 RX ADMIN — HALOPERIDOL DECANOATE 5 MILLIGRAM(S): 100 INJECTION INTRAMUSCULAR at 20:53

## 2020-06-12 RX ADMIN — Medication 1 APPLICATION(S): at 08:35

## 2020-06-13 PROCEDURE — 99231 SBSQ HOSP IP/OBS SF/LOW 25: CPT

## 2020-06-13 RX ORDER — TRAZODONE HCL 50 MG
50 TABLET ORAL ONCE
Refills: 0 | Status: COMPLETED | OUTPATIENT
Start: 2020-06-13 | End: 2020-06-13

## 2020-06-13 RX ORDER — DIPHENHYDRAMINE HCL 50 MG
50 CAPSULE ORAL ONCE
Refills: 0 | Status: COMPLETED | OUTPATIENT
Start: 2020-06-13 | End: 2020-06-13

## 2020-06-13 RX ADMIN — Medication 50 MILLIGRAM(S): at 02:07

## 2020-06-13 RX ADMIN — Medication 50 MILLIGRAM(S): at 20:09

## 2020-06-13 RX ADMIN — LITHIUM CARBONATE 900 MILLIGRAM(S): 300 TABLET, EXTENDED RELEASE ORAL at 20:10

## 2020-06-13 RX ADMIN — Medication 5 MILLIGRAM(S): at 22:19

## 2020-06-13 RX ADMIN — Medication 1 APPLICATION(S): at 20:10

## 2020-06-13 RX ADMIN — Medication 3 MILLIGRAM(S): at 00:16

## 2020-06-13 RX ADMIN — Medication 50 MILLIGRAM(S): at 00:16

## 2020-06-13 RX ADMIN — Medication 1 APPLICATION(S): at 09:00

## 2020-06-14 PROCEDURE — 99231 SBSQ HOSP IP/OBS SF/LOW 25: CPT

## 2020-06-14 RX ADMIN — Medication 50 MILLIGRAM(S): at 20:47

## 2020-06-14 RX ADMIN — Medication 650 MILLIGRAM(S): at 21:20

## 2020-06-14 RX ADMIN — Medication 1 APPLICATION(S): at 20:46

## 2020-06-14 RX ADMIN — Medication 1 APPLICATION(S): at 08:58

## 2020-06-14 RX ADMIN — Medication 5 MILLIGRAM(S): at 23:27

## 2020-06-14 RX ADMIN — LITHIUM CARBONATE 900 MILLIGRAM(S): 300 TABLET, EXTENDED RELEASE ORAL at 20:47

## 2020-06-15 DIAGNOSIS — I10 ESSENTIAL (PRIMARY) HYPERTENSION: ICD-10-CM

## 2020-06-15 DIAGNOSIS — F31.2 BIPOLAR DISORDER, CURRENT EPISODE MANIC SEVERE WITH PSYCHOTIC FEATURES: ICD-10-CM

## 2020-06-15 DIAGNOSIS — Z91.19 PATIENT'S NONCOMPLIANCE WITH OTHER MEDICAL TREATMENT AND REGIMEN: ICD-10-CM

## 2020-06-15 DIAGNOSIS — U07.1 COVID-19: ICD-10-CM

## 2020-06-15 DIAGNOSIS — F41.9 ANXIETY DISORDER, UNSPECIFIED: ICD-10-CM

## 2020-06-15 DIAGNOSIS — F14.10 COCAINE ABUSE, UNCOMPLICATED: ICD-10-CM

## 2020-06-15 DIAGNOSIS — R19.7 DIARRHEA, UNSPECIFIED: ICD-10-CM

## 2020-06-15 DIAGNOSIS — L08.9 LOCAL INFECTION OF THE SKIN AND SUBCUTANEOUS TISSUE, UNSPECIFIED: ICD-10-CM

## 2020-06-15 DIAGNOSIS — Z59.0 HOMELESSNESS: ICD-10-CM

## 2020-06-15 PROCEDURE — 99232 SBSQ HOSP IP/OBS MODERATE 35: CPT

## 2020-06-15 RX ORDER — TRAZODONE HCL 50 MG
100 TABLET ORAL AT BEDTIME
Refills: 0 | Status: DISCONTINUED | OUTPATIENT
Start: 2020-06-15 | End: 2020-06-16

## 2020-06-15 RX ORDER — DIPHENHYDRAMINE HCL 50 MG
50 CAPSULE ORAL ONCE
Refills: 0 | Status: DISCONTINUED | OUTPATIENT
Start: 2020-06-15 | End: 2020-06-16

## 2020-06-15 RX ORDER — HYDROXYZINE HCL 10 MG
50 TABLET ORAL ONCE
Refills: 0 | Status: COMPLETED | OUTPATIENT
Start: 2020-06-15 | End: 2020-06-15

## 2020-06-15 RX ADMIN — Medication 1 APPLICATION(S): at 09:30

## 2020-06-15 RX ADMIN — Medication 1 APPLICATION(S): at 20:42

## 2020-06-15 RX ADMIN — Medication 50 MILLIGRAM(S): at 20:44

## 2020-06-15 RX ADMIN — LITHIUM CARBONATE 900 MILLIGRAM(S): 300 TABLET, EXTENDED RELEASE ORAL at 20:43

## 2020-06-15 RX ADMIN — Medication 100 MILLIGRAM(S): at 20:43

## 2020-06-15 RX ADMIN — Medication 50 MILLIGRAM(S): at 02:54

## 2020-06-15 RX ADMIN — Medication 50 MILLIGRAM(S): at 21:55

## 2020-06-15 SDOH — ECONOMIC STABILITY - HOUSING INSECURITY: HOMELESSNESS: Z59.0

## 2020-06-16 PROCEDURE — 99232 SBSQ HOSP IP/OBS MODERATE 35: CPT

## 2020-06-16 RX ORDER — TRAZODONE HCL 50 MG
150 TABLET ORAL AT BEDTIME
Refills: 0 | Status: DISCONTINUED | OUTPATIENT
Start: 2020-06-16 | End: 2020-06-17

## 2020-06-16 RX ADMIN — Medication 150 MILLIGRAM(S): at 20:38

## 2020-06-16 RX ADMIN — LITHIUM CARBONATE 900 MILLIGRAM(S): 300 TABLET, EXTENDED RELEASE ORAL at 20:38

## 2020-06-16 RX ADMIN — Medication 50 MILLIGRAM(S): at 22:13

## 2020-06-17 LAB — LITHIUM SERPL-MCNC: 0.54 MMOL/L — LOW (ref 0.6–1.2)

## 2020-06-17 PROCEDURE — 99232 SBSQ HOSP IP/OBS MODERATE 35: CPT

## 2020-06-17 RX ORDER — LITHIUM CARBONATE 300 MG/1
3 TABLET, EXTENDED RELEASE ORAL
Qty: 90 | Refills: 0
Start: 2020-06-17

## 2020-06-17 RX ORDER — TRAZODONE HCL 50 MG
200 TABLET ORAL AT BEDTIME
Refills: 0 | Status: DISCONTINUED | OUTPATIENT
Start: 2020-06-17 | End: 2020-06-19

## 2020-06-17 RX ORDER — TRAZODONE HCL 50 MG
2 TABLET ORAL
Qty: 60 | Refills: 0
Start: 2020-06-17

## 2020-06-17 RX ADMIN — Medication 200 MILLIGRAM(S): at 20:58

## 2020-06-17 RX ADMIN — Medication 1 APPLICATION(S): at 08:12

## 2020-06-17 RX ADMIN — Medication 50 MILLIGRAM(S): at 22:44

## 2020-06-17 RX ADMIN — LITHIUM CARBONATE 900 MILLIGRAM(S): 300 TABLET, EXTENDED RELEASE ORAL at 20:58

## 2020-06-18 PROCEDURE — 99232 SBSQ HOSP IP/OBS MODERATE 35: CPT

## 2020-06-18 RX ORDER — LANOLIN ALCOHOL/MO/W.PET/CERES
3 CREAM (GRAM) TOPICAL ONCE
Refills: 0 | Status: COMPLETED | OUTPATIENT
Start: 2020-06-18 | End: 2020-06-18

## 2020-06-18 RX ADMIN — Medication 50 MILLIGRAM(S): at 22:16

## 2020-06-18 RX ADMIN — Medication 200 MILLIGRAM(S): at 21:10

## 2020-06-18 RX ADMIN — Medication 3 MILLIGRAM(S): at 03:00

## 2020-06-18 RX ADMIN — Medication 5 MILLIGRAM(S): at 01:08

## 2020-06-18 RX ADMIN — HALOPERIDOL DECANOATE 5 MILLIGRAM(S): 100 INJECTION INTRAMUSCULAR at 22:16

## 2020-06-18 RX ADMIN — LITHIUM CARBONATE 900 MILLIGRAM(S): 300 TABLET, EXTENDED RELEASE ORAL at 21:10

## 2020-06-19 PROCEDURE — 99232 SBSQ HOSP IP/OBS MODERATE 35: CPT

## 2020-06-19 RX ORDER — TRAZODONE HCL 50 MG
300 TABLET ORAL AT BEDTIME
Refills: 0 | Status: DISCONTINUED | OUTPATIENT
Start: 2020-06-19 | End: 2020-06-22

## 2020-06-19 RX ADMIN — Medication 300 MILLIGRAM(S): at 20:40

## 2020-06-19 RX ADMIN — Medication 5 MILLIGRAM(S): at 00:00

## 2020-06-19 RX ADMIN — Medication 50 MILLIGRAM(S): at 22:34

## 2020-06-19 RX ADMIN — LITHIUM CARBONATE 900 MILLIGRAM(S): 300 TABLET, EXTENDED RELEASE ORAL at 20:40

## 2020-06-19 RX ADMIN — Medication 1 APPLICATION(S): at 20:40

## 2020-06-20 RX ORDER — ACETAMINOPHEN 500 MG
650 TABLET ORAL ONCE
Refills: 0 | Status: COMPLETED | OUTPATIENT
Start: 2020-06-20 | End: 2020-06-20

## 2020-06-20 RX ADMIN — Medication 650 MILLIGRAM(S): at 00:00

## 2020-06-20 RX ADMIN — Medication 650 MILLIGRAM(S): at 20:43

## 2020-06-20 RX ADMIN — Medication 5 MILLIGRAM(S): at 01:15

## 2020-06-20 RX ADMIN — LITHIUM CARBONATE 900 MILLIGRAM(S): 300 TABLET, EXTENDED RELEASE ORAL at 20:43

## 2020-06-20 RX ADMIN — Medication 50 MILLIGRAM(S): at 22:31

## 2020-06-20 RX ADMIN — Medication 300 MILLIGRAM(S): at 20:43

## 2020-06-21 RX ADMIN — LITHIUM CARBONATE 900 MILLIGRAM(S): 300 TABLET, EXTENDED RELEASE ORAL at 21:25

## 2020-06-21 RX ADMIN — Medication 300 MILLIGRAM(S): at 21:25

## 2020-06-21 RX ADMIN — Medication 50 MILLIGRAM(S): at 22:23

## 2020-06-21 RX ADMIN — Medication 650 MILLIGRAM(S): at 21:25

## 2020-06-21 RX ADMIN — Medication 5 MILLIGRAM(S): at 01:48

## 2020-06-21 RX ADMIN — Medication 30 MILLILITER(S): at 01:48

## 2020-06-22 VITALS — SYSTOLIC BLOOD PRESSURE: 128 MMHG | DIASTOLIC BLOOD PRESSURE: 80 MMHG | TEMPERATURE: 97 F

## 2020-06-22 PROCEDURE — 99239 HOSP IP/OBS DSCHRG MGMT >30: CPT

## 2020-06-22 RX ADMIN — Medication 1 APPLICATION(S): at 08:33

## 2020-06-22 RX ADMIN — Medication 5 MILLIGRAM(S): at 01:50

## 2020-06-25 DIAGNOSIS — B35.1 TINEA UNGUIUM: ICD-10-CM

## 2020-06-25 DIAGNOSIS — R45.850 HOMICIDAL IDEATIONS: ICD-10-CM

## 2020-06-25 DIAGNOSIS — F14.20 COCAINE DEPENDENCE, UNCOMPLICATED: ICD-10-CM

## 2020-06-25 DIAGNOSIS — F11.21 OPIOID DEPENDENCE, IN REMISSION: ICD-10-CM

## 2020-08-22 NOTE — PROGRESS NOTE BEHAVIORAL HEALTH - PROBLEM SELECTOR PLAN 2
5 1.Monitor COVID labs   2.Supportive care  3.Monitor vital signs including Temp, HR, O2 Sats  4.Rechecked  COVID -19 PCR 6/3/2020. POSITIVE

## 2020-09-01 PROCEDURE — G9005: CPT

## 2021-06-14 NOTE — PROGRESS NOTE BEHAVIORAL HEALTH - PROBLEM SELECTOR PROBLEM 1
No
Major depressive disorder, recurrent episode, severe, with psychotic behavior
Nonadherence to medical treatment
Major depressive disorder, recurrent episode, severe, with psychotic behavior
Nonadherence to medical treatment

## 2021-07-08 NOTE — PROGRESS NOTE BEHAVIORAL HEALTH - PROBLEM SELECTOR PLAN 3
Patient c/o fatigue and admits to having to care for her Mother daily. She states \"I just wanted to come in to get checked out. \" 1.Monitor COVID labs   2.Supportive care  3.Monitor vital signs including Temp, HR, O2 Sats

## 2022-02-25 NOTE — DISCHARGE NOTE BEHAVIORAL HEALTH - REASON FOR ADMISSION
·  " I came to the hospital because I wasn't feeling good for the past  2 weeks. I'm normally a workaholic but I felt tired and I had diarrhea. Yeah and I  was hearing voices and seeing things and I said I wanted to kill my cousin."    	Note - On 5/28/2020, this writer filed a pt SAFE ACT report due to pt's HI  and SI  as above.
fall

## 2023-07-29 NOTE — DISCHARGE NOTE BEHAVIORAL HEALTH - NSBHDCAPPT1DT_PSY_A_CORE
Patient with 3 days of crampy intermittent abdominal pain, assoc with lower back pain and R>L CVA tenderness. Most likely 2/2 to pyelonephritis. Lower concern for: pancreatitis (negative lipase), cystitis (no urinary symptoms, no suprapubic tenderness), kidney stone (not seen on imaging).     Plan:  - See above 11-Jun-2020

## 2023-10-26 NOTE — DISCHARGE NOTE BEHAVIORAL HEALTH - NSTOBACCONEVERSMOKERY/N_GEN_A
Physical Therapy    Visit Type: treatment  SUBJECTIVE  Patient agreed to participate in therapy this date.  RN in agreement to work with patient for therapy session.  \"I vomited, the food here is terrible\" writer entered room just after patient vomited lunch with patient holding full emesis bag, addressed with and managed by nursing.  Patient / Family Goal: return home and maximize function    Pain     Location: over surgical site    At onset of session (out of 10): 7     OBJECTIVE     Cognitive Status   Orientation    - Oriented to: person, place, time and situation  Attention Span    - Attention: - difficulty dividing attention - attends with cues to redirect   - Attention impairment: agitation, fatigue and impulsivity  Following Direction   - follows one step commands with increased time and follows one step commands with repetition  Executive Function    - Problem Solving: impaired   - Planning: impaired  Memory   - - decreased short term memory  Safety Awareness/Insight   - impulsive and decreased awareness of need for safety  Awareness of Deficits   - decreased awareness of deficits  Less agitation and improved short term memory noted compared to previous session, however still requiring mod repetition of commands and min emotional redirection and extra time for attention to task    Patient Activity Tolerance: 1 to 2 activity to rest      Strength  (out of 5 unless noted, standard test position unless noted)   Comments / Details: Pt demonstrating bilateral knee hyperextension during stance phase of gait and during static standing balance      Standing Balance  (LYRIC = base of support)  Firm Surface: Double Leg      - Static, Eyes Open       - Trial 1 details: moderate assist and with double UE support     - Dynamic, Eyes Open       - Trial 1 details: moderate assist and with double UE support       Bed Mobility  - Supine to sit: moderate assist (assist for trunk and R lower extremity to edge of bed, head of bed  elevated and use of bed rail)  - Sit to supine: minimal assist (assist for R lower extremity to return to supine, min verbal cues for sequencing throughout)  Transfers  Assistive devices: gait belt, 2-wheeled walker  - Sit to stand: moderate assist  - Stand to sit: moderate assist  Mod A for AD placement during all transfers as pt heavily relies on bilateral upper extremities throughout    Ambulation / Gait  - Assistive device: gait belt and two-wheeled walker  - Distance (feet unless otherwise indicated): 10  multiple extended standing rest breaks throughout  - Assist Level: moderate assist  - Surface: even  - Description: antalgic, forward flexed at hips, decreased step length right, decreased step length left, shuffling, unsteady, loss of balance and heavy reliance on BUE (extremely short bilateral step length throughout)  Mod A required to maintain terminal hip extension as pt unable to maintain for more than a few seconds at a time.     Interventions     Supine    Lower Extremity: Bilateral: ankle pumps, AROM, 5 reps, 1 sets  Skilled input: Verbal instruction/cues, tactile instruction/cues, posture correction and facilitation  Verbal Consent: Writer verbally educated and received verbal consent for hand placement, positioning of patient, and techniques to be performed today from patient for therapist position for techniques and hand placement and palpation for techniques as described above and how they are pertinent to the patient's plan of care.         Education:   - Present and ready to learn: patient  Education provided during session:  - Results of above outlined education: Verbalizes understanding and Needs reinforcement    ASSESSMENT   Progress: progressing toward goals  Interferring components: decreased activity tolerance and medical status limitations    Discharge needs based on today's assessment:   - Current level of function: significantly below baseline level of function   - Therapy needs at  discharge: therapy 5 or more times per week   - Activities of daily living (ADLs) requiring support at discharge: bed mobility, transfers, ambulation and stairs   - Impairments that require further therapy intervention: activity tolerance, balance, safety awareness, strength, pain and cognition    AM-PAC  - Generalized Prior Level of Function: Needs a little help ([facility] 12-21)       Key: MOD A=moderate assistance, IND/MOD I=independent/modified independent  - Generalized Current Level of Function     - Current Mobility Score: 13       AM-PAC Scoring Key= >21 Modified Independent; 20-21 Supervision; 18-19 Minimal assist; 13-18 Moderate assist; 9-12 Max assist; <9 Total assist        PLAN (while hospitalized)  Suggestions for next session as indicated: pre-medicate for session, second hand for safe mobility progression, LE strengthening, bed mobility, transfers, gait training with 2WW, standing balance, stair negotiation, activity tolerance, energy conservation, breathing exercises/postural strengthening, pt/family transfer training, fall prevention/home safety training.    PT Frequency: 6-7 x per week      PT/OT Mobility Equipment for Discharge: pt owns 2WW  PT/OT ADL Equipment for Discharge: continue to assess  Agreement to plan and goals: patient agrees with goals and treatment plan        GOALS  Review Date: 11/1/2023  Long Term Goals: (to be met by time of discharge from hospital)  State precautions: Patient able to state precautions independent.  Maintain precautions: Patient able to maintain precautions independent.  Sit to supine: Patient will complete sit to supine independent.  Supine to sit: Patient will complete supine to sit independent.  Sit to stand: Patient will complete sit to stand transfer with 2-wheeled walker, modified independent.   Stand to sit: Patient will complete stand to sit transfer with 2-wheeled walker, modified independent.   Ambulation (even): Patient will ambulate on even surface for  60 feet with 2-wheeled walker, modified independent.   3-4 steps: Patient will ambulate 3-4 steps with least restrictive device using 2 rails, modified independent.     Documented in the chart in the following areas: Assessment/Plan.      Patient at End of Session:   Location: in bed  Safety measures: alarm system in place/re-engaged, call light within reach, equipment intact, lines intact and bed rails x2  Handoff to: nurse      Therapy procedure time and total treatment time can be found documented on the Time Entry flowsheet   No

## 2025-03-24 NOTE — DISCHARGE NOTE BEHAVIORAL HEALTH - ADMISSION DATE +STARTOFVISITDATE
Statement Selected chest wall non-tender, breathing is unlabored without accessory muscle use, normal breath sounds